# Patient Record
Sex: MALE | Race: BLACK OR AFRICAN AMERICAN | NOT HISPANIC OR LATINO | Employment: FULL TIME | URBAN - METROPOLITAN AREA
[De-identification: names, ages, dates, MRNs, and addresses within clinical notes are randomized per-mention and may not be internally consistent; named-entity substitution may affect disease eponyms.]

---

## 2020-11-20 ENCOUNTER — OFFICE VISIT (OUTPATIENT)
Dept: FAMILY MEDICINE CLINIC | Facility: CLINIC | Age: 35
End: 2020-11-20

## 2020-11-20 VITALS
DIASTOLIC BLOOD PRESSURE: 90 MMHG | BODY MASS INDEX: 25.8 KG/M2 | HEIGHT: 70 IN | OXYGEN SATURATION: 98 % | TEMPERATURE: 98 F | RESPIRATION RATE: 14 BRPM | WEIGHT: 180.2 LBS | SYSTOLIC BLOOD PRESSURE: 126 MMHG | HEART RATE: 71 BPM

## 2020-11-20 DIAGNOSIS — K64.9 BLEEDING HEMORRHOID: ICD-10-CM

## 2020-11-20 DIAGNOSIS — Z72.51 HIGH RISK HETEROSEXUAL BEHAVIOR: Primary | ICD-10-CM

## 2020-11-20 DIAGNOSIS — R22.2 MASS OF SKIN OF ABDOMEN: ICD-10-CM

## 2020-11-20 DIAGNOSIS — K62.5 PAINLESS RECTAL BLEEDING: ICD-10-CM

## 2020-11-20 PROCEDURE — 86803 HEPATITIS C AB TEST: CPT | Performed by: FAMILY MEDICINE

## 2020-11-20 PROCEDURE — 90471 IMMUNIZATION ADMIN: CPT | Performed by: FAMILY MEDICINE

## 2020-11-20 PROCEDURE — G0432 EIA HIV-1/HIV-2 SCREEN: HCPCS | Performed by: FAMILY MEDICINE

## 2020-11-20 PROCEDURE — 99204 OFFICE O/P NEW MOD 45 MIN: CPT | Performed by: FAMILY MEDICINE

## 2020-11-20 PROCEDURE — 90715 TDAP VACCINE 7 YRS/> IM: CPT | Performed by: FAMILY MEDICINE

## 2020-11-20 PROCEDURE — 86592 SYPHILIS TEST NON-TREP QUAL: CPT | Performed by: FAMILY MEDICINE

## 2020-11-20 NOTE — PROGRESS NOTES
Grupo Sandoval is a 35 y.o. male who presents today to establish care.    Chief Complaint   Patient presents with   • Establish Care   • Mass     bumps on stomach x 1 year        Patient is here to establish care. He was previously being seen by  in [place] and was not happy with the care he was being given. He has his annual wellness exam in March of this year. He would like to be screening for STDs as he has had unprotected sex with 4 new female partners since the last time he was checked. He has occasional burning with urination but no other symptoms. He does not know if any of the women have had similar or different symptoms. Patient reports he has two bumps on on either side of his abdomen that have been present for about a year. The one on the left has been increasing and size and is tender to the touch. No skin changes, heat to touch, or redness. He denies N/V/D/C, or weight changes. He has noticed that over the past year he has noticed blood in the toilet and on his toilet paper periodically. He has not noticed if the blood is on his stool or in it. He denies pain with defecation. His father had colon cancer or prostate cancer in his 50's but he doesn't know which one.          Review of Systems   Constitutional: Negative for diaphoresis, fatigue, fever and unexpected weight loss.   HENT: Negative for congestion, ear pain and sore throat.    Eyes: Negative for visual disturbance.   Respiratory: Negative for cough, shortness of breath and wheezing.    Cardiovascular: Negative for chest pain and palpitations.   Gastrointestinal: Positive for anal bleeding and blood in stool. Negative for abdominal pain, constipation, diarrhea, nausea, vomiting and GERD.   Endocrine: Negative for polydipsia and polyuria.   Genitourinary: Positive for dysuria. Negative for decreased urine volume, difficulty urinating, discharge, penile pain, penile swelling, scrotal swelling, testicular pain, urgency and urinary  incontinence.   Musculoskeletal: Negative for joint swelling.   Skin: Negative for rash and skin lesions.        mass   Allergic/Immunologic: Negative for environmental allergies.   Neurological: Negative for seizures and syncope.   Hematological: Does not bruise/bleed easily.   Psychiatric/Behavioral: Negative for suicidal ideas.        PHQ-9 Depression Screening  Little interest or pleasure in doing things? 0   Feeling down, depressed, or hopeless? 0   Trouble falling or staying asleep, or sleeping too much?     Feeling tired or having little energy?     Poor appetite or overeating?     Feeling bad about yourself - or that you are a failure or have let yourself or your family down?     Trouble concentrating on things, such as reading the newspaper or watching television?     Moving or speaking so slowly that other people could have noticed? Or the opposite - being so fidgety or restless that you have been moving around a lot more than usual?     Thoughts that you would be better off dead, or of hurting yourself in some way?     PHQ-9 Total Score 0   If you checked off any problems, how difficult have these problems made it for you to do your work, take care of things at home, or get along with other people?         History reviewed. No pertinent past medical history.     Past Surgical History:   Procedure Laterality Date   • WISDOM TOOTH EXTRACTION  2014        Family History   Problem Relation Age of Onset   • Stroke Mother         multiple   • Cancer Father         Unsure wether colon or prostate   • No Known Problems Sister    • No Known Problems Brother    • No Known Problems Son    • Cancer Maternal Grandmother         Unsure specifics   • Cancer Maternal Grandfather         Unsure Specifics   • Other Paternal Grandmother         possible brain tumor   • Other Paternal Grandfather         unknown   • No Known Problems Son         Social History     Socioeconomic History   • Marital status: Single     Spouse  "name: Not on file   • Number of children: Not on file   • Years of education: Not on file   • Highest education level: Not on file   Tobacco Use   • Smoking status: Former Smoker   • Smokeless tobacco: Never Used   Substance and Sexual Activity   • Alcohol use: Yes     Frequency: 2-3 times a week     Comment: Occasionally Roughly a pint of liquor a week   • Drug use: Yes     Types: Marijuana     Comment: occasional   • Sexual activity: Yes     Partners: Female     Birth control/protection: None     Comment: 7 female partners in the last year        No current outpatient medications on file prior to visit.     No current facility-administered medications on file prior to visit.        No Known Allergies     Visit Vitals  /90   Pulse 71   Temp 98 °F (36.7 °C) (Temporal)   Resp 14   Ht 177.8 cm (70\")   Wt 81.7 kg (180 lb 3.2 oz)   SpO2 98%   BMI 25.86 kg/m²      Body mass index is 25.86 kg/m².    Physical Exam  Constitutional:       General: He is not in acute distress.     Appearance: He is well-developed. He is not diaphoretic.   HENT:      Head: Atraumatic.   Neck:      Musculoskeletal: Normal range of motion and neck supple.   Cardiovascular:      Rate and Rhythm: Normal rate and regular rhythm.      Heart sounds: Normal heart sounds. No murmur. No friction rub. No gallop.    Pulmonary:      Effort: Pulmonary effort is normal. No respiratory distress.      Breath sounds: Normal breath sounds. No wheezing or rales.   Abdominal:      General: Bowel sounds are normal. There is no distension.      Palpations: Abdomen is soft. There is mass (2 soft tissue skin masses that are firm and mobile approximately 1 to 1/2 cm diameter 1 was 10 cm to the right of the umbilicus the other was in the left mid axillary line just below the ribs.).      Tenderness: There is no abdominal tenderness.   Skin:     General: Skin is warm and dry.   Neurological:      Mental Status: He is alert and oriented to person, place, and time. "   Psychiatric:         Behavior: Behavior normal.          No results found for this or any previous visit.     Problems Addressed this Visit        Digestive    Painless rectal bleeding     Patient was given treatment for hemorrhoids.  He will begin supportive care for hemorrhoids.  If he continues to have rectal bleeding is follow-up in 8 weeks will refer to colorectal surgery for further evaluation.         Relevant Medications    Hydrocort-Pramoxine, Perianal, (PROCTOFOAM-HS) 1-1 % rectal foam       Musculoskeletal and Integument    Mass of skin of abdomen     Mass most likely lipoma.  Will order ultrasound for further evaluation.         Relevant Orders    US Abdomen Limited       Other    High risk heterosexual behavior - Primary     We will recheck for STDs at this time.         Relevant Orders    Hepatitis C Antibody    HIV-1 / O / 2 Ag / Antibody 4th Generation    RPR      Other Visit Diagnoses     Bleeding hemorrhoid        Relevant Medications    Hydrocort-Pramoxine, Perianal, (PROCTOFOAM-HS) 1-1 % rectal foam      Diagnoses       Codes Comments    High risk heterosexual behavior    -  Primary ICD-10-CM: Z72.51  ICD-9-CM: V69.2     Mass of skin of abdomen     ICD-10-CM: R22.2  ICD-9-CM: 782.2     Painless rectal bleeding     ICD-10-CM: K62.5  ICD-9-CM: 569.3     Bleeding hemorrhoid     ICD-10-CM: K64.9  ICD-9-CM: 455.8           Return in about 8 weeks (around 1/15/2021) for Follow-up rectal bleeding.    Parts of this office note have been dictated by voice recognition software. Grammatical and/or spelling errors may be present.     Jacob Barahona MD  11/20/2020

## 2020-11-20 NOTE — ASSESSMENT & PLAN NOTE
Patient was given treatment for hemorrhoids.  He will begin supportive care for hemorrhoids.  If he continues to have rectal bleeding is follow-up in 8 weeks will refer to colorectal surgery for further evaluation.

## 2020-11-20 NOTE — PATIENT INSTRUCTIONS
Hemorrhoids  Hemorrhoids are swollen veins in and around the rectum or anus. There are two types of hemorrhoids:  · Internal hemorrhoids. These occur in the veins that are just inside the rectum. They may poke through to the outside and become irritated and painful.  · External hemorrhoids. These occur in the veins that are outside the anus and can be felt as a painful swelling or hard lump near the anus.  Most hemorrhoids do not cause serious problems, and they can be managed with home treatments such as diet and lifestyle changes. If home treatments do not help the symptoms, procedures can be done to shrink or remove the hemorrhoids.  What are the causes?  This condition is caused by increased pressure in the anal area. This pressure may result from various things, including:  · Constipation.  · Straining to have a bowel movement.  · Diarrhea.  · Pregnancy.  · Obesity.  · Sitting for long periods of time.  · Heavy lifting or other activity that causes you to strain.  · Anal sex.  · Riding a bike for a long period of time.  What are the signs or symptoms?  Symptoms of this condition include:  · Pain.  · Anal itching or irritation.  · Rectal bleeding.  · Leakage of stool (feces).  · Anal swelling.  · One or more lumps around the anus.  How is this diagnosed?  This condition can often be diagnosed through a visual exam. Other exams or tests may also be done, such as:  · An exam that involves feeling the rectal area with a gloved hand (digital rectal exam).  · An exam of the anal canal that is done using a small tube (anoscope).  · A blood test, if you have lost a significant amount of blood.  · A test to look inside the colon using a flexible tube with a camera on the end (sigmoidoscopy or colonoscopy).  How is this treated?  This condition can usually be treated at home. However, various procedures may be done if dietary changes, lifestyle changes, and other home treatments do not help your symptoms. These  procedures can help make the hemorrhoids smaller or remove them completely. Some of these procedures involve surgery, and others do not. Common procedures include:  · Rubber band ligation. Rubber bands are placed at the base of the hemorrhoids to cut off their blood supply.  · Sclerotherapy. Medicine is injected into the hemorrhoids to shrink them.  · Infrared coagulation. A type of light energy is used to get rid of the hemorrhoids.  · Hemorrhoidectomy surgery. The hemorrhoids are surgically removed, and the veins that supply them are tied off.  · Stapled hemorrhoidopexy surgery. The surgeon staples the base of the hemorrhoid to the rectal wall.  Follow these instructions at home:  Eating and drinking    · Eat foods that have a lot of fiber in them, such as whole grains, beans, nuts, fruits, and vegetables.  · Ask your health care provider about taking products that have added fiber (fiber supplements).  · Reduce the amount of fat in your diet. You can do this by eating low-fat dairy products, eating less red meat, and avoiding processed foods.  · Drink enough fluid to keep your urine pale yellow.  Managing pain and swelling    · Take warm sitz baths for 20 minutes, 3-4 times a day to ease pain and discomfort. You may do this in a bathtub or using a portable sitz bath that fits over the toilet.  · If directed, apply ice to the affected area. Using ice packs between sitz baths may be helpful.  ? Put ice in a plastic bag.  ? Place a towel between your skin and the bag.  ? Leave the ice on for 20 minutes, 2-3 times a day.  General instructions  · Take over-the-counter and prescription medicines only as told by your health care provider.  · Use medicated creams or suppositories as told.  · Get regular exercise. Ask your health care provider how much and what kind of exercise is best for you. In general, you should do moderate exercise for at least 30 minutes on most days of the week (150 minutes each week). This can  include activities such as walking, biking, or yoga.  · Go to the bathroom when you have the urge to have a bowel movement. Do not wait.  · Avoid straining to have bowel movements.  · Keep the anal area dry and clean. Use wet toilet paper or moist towelettes after a bowel movement.  · Do not sit on the toilet for long periods of time. This increases blood pooling and pain.  · Keep all follow-up visits as told by your health care provider. This is important.  Contact a health care provider if you have:  · Increasing pain and swelling that are not controlled by treatment or medicine.  · Difficulty having a bowel movement, or you are unable to have a bowel movement.  · Pain or inflammation outside the area of the hemorrhoids.  Get help right away if you have:  · Uncontrolled bleeding from your rectum.  Summary  · Hemorrhoids are swollen veins in and around the rectum or anus.  · Most hemorrhoids can be managed with home treatments such as diet and lifestyle changes.  · Taking warm sitz baths can help ease pain and discomfort.  · In severe cases, procedures or surgery can be done to shrink or remove the hemorrhoids.  This information is not intended to replace advice given to you by your health care provider. Make sure you discuss any questions you have with your health care provider.  Document Released: 12/15/2001 Document Revised: 05/15/2020 Document Reviewed: 05/09/2019  Elsevier Patient Education © 2020 [employer]

## 2020-11-21 LAB
HCV AB SER DONR QL: NORMAL
HIV1+2 AB SER QL: NORMAL
RPR SER QL: NORMAL

## 2020-12-01 DIAGNOSIS — K62.5 PAINLESS RECTAL BLEEDING: Primary | ICD-10-CM

## 2020-12-01 RX ORDER — HYDROCORTISONE ACETATE PRAMOXINE HCL 1; 1 G/100G; G/100G
CREAM TOPICAL 2 TIMES DAILY
Qty: 28.4 G | Refills: 1 | Status: SHIPPED | OUTPATIENT
Start: 2020-12-01 | End: 2022-04-12

## 2020-12-04 ENCOUNTER — HOSPITAL ENCOUNTER (OUTPATIENT)
Dept: ULTRASOUND IMAGING | Facility: HOSPITAL | Age: 35
Discharge: HOME | End: 2020-12-04
Admitting: FAMILY MEDICINE

## 2020-12-04 DIAGNOSIS — R22.2 MASS OF SKIN OF ABDOMEN: ICD-10-CM

## 2020-12-04 PROCEDURE — 76705 ECHO EXAM OF ABDOMEN: CPT

## 2021-07-02 ENCOUNTER — E-VISIT (OUTPATIENT)
Dept: FAMILY MEDICINE CLINIC | Facility: TELEHEALTH | Age: 36
End: 2021-07-02

## 2021-07-02 DIAGNOSIS — R39.89 SUSPECTED UTI: Primary | ICD-10-CM

## 2021-07-02 PROCEDURE — 99422 OL DIG E/M SVC 11-20 MIN: CPT | Performed by: NURSE PRACTITIONER

## 2021-07-02 RX ORDER — SULFAMETHOXAZOLE AND TRIMETHOPRIM 800; 160 MG/1; MG/1
1 TABLET ORAL 2 TIMES DAILY
Qty: 14 TABLET | Refills: 0 | Status: SHIPPED | OUTPATIENT
Start: 2021-07-02 | End: 2021-07-09

## 2021-07-02 NOTE — PATIENT INSTRUCTIONS
Urinary Tract Infection, Adult    A urinary tract infection (UTI) is an infection of any part of the urinary tract. The urinary tract includes the kidneys, ureters, bladder, and urethra. These organs make, store, and get rid of urine in the body.  Your health care provider may use other names to describe the infection. An upper UTI affects the ureters and kidneys (pyelonephritis). A lower UTI affects the bladder (cystitis) and urethra (urethritis).  What are the causes?  Most urinary tract infections are caused by bacteria in your genital area, around the entrance to your urinary tract (urethra). These bacteria grow and cause inflammation of your urinary tract.  What increases the risk?  You are more likely to develop this condition if:  · You have a urinary catheter that stays in place (indwelling).  · You are not able to control when you urinate or have a bowel movement (you have incontinence).  · You are female and you:  ? Use a spermicide or diaphragm for birth control.  ? Have low estrogen levels.  ? Are pregnant.  · You have certain genes that increase your risk (genetics).  · You are sexually active.  · You take antibiotic medicines.  · You have a condition that causes your flow of urine to slow down, such as:  ? An enlarged prostate, if you are male.  ? Blockage in your urethra (stricture).  ? A kidney stone.  ? A nerve condition that affects your bladder control (neurogenic bladder).  ? Not getting enough to drink, or not urinating often.  · You have certain medical conditions, such as:  ? Diabetes.  ? A weak disease-fighting system (immunesystem).  ? Sickle cell disease.  ? Gout.  ? Spinal cord injury.  What are the signs or symptoms?  Symptoms of this condition include:  · Needing to urinate right away (urgently).  · Frequent urination or passing small amounts of urine frequently.  · Pain or burning with urination.  · Blood in the urine.  · Urine that smells bad or unusual.  · Trouble urinating.  · Cloudy  urine.  · Vaginal discharge, if you are female.  · Pain in the abdomen or the lower back.  You may also have:  · Vomiting or a decreased appetite.  · Confusion.  · Irritability or tiredness.  · A fever.  · Diarrhea.  The first symptom in older adults may be confusion. In some cases, they may not have any symptoms until the infection has worsened.  How is this diagnosed?  This condition is diagnosed based on your medical history and a physical exam. You may also have other tests, including:  · Urine tests.  · Blood tests.  · Tests for sexually transmitted infections (STIs).  If you have had more than one UTI, a cystoscopy or imaging studies may be done to determine the cause of the infections.  How is this treated?  Treatment for this condition includes:  · Antibiotic medicine.  · Over-the-counter medicines to treat discomfort.  · Drinking enough water to stay hydrated.  If you have frequent infections or have other conditions such as a kidney stone, you may need to see a health care provider who specializes in the urinary tract (urologist).  In rare cases, urinary tract infections can cause sepsis. Sepsis is a life-threatening condition that occurs when the body responds to an infection. Sepsis is treated in the hospital with IV antibiotics, fluids, and other medicines.  Follow these instructions at home:    Medicines  · Take over-the-counter and prescription medicines only as told by your health care provider.  · If you were prescribed an antibiotic medicine, take it as told by your health care provider. Do not stop using the antibiotic even if you start to feel better.  General instructions  · Make sure you:  ? Empty your bladder often and completely. Do not hold urine for long periods of time.  ? Empty your bladder after sex.  ? Wipe from front to back after a bowel movement if you are female. Use each tissue one time when you wipe.  · Drink enough fluid to keep your urine pale yellow.  · Keep all follow-up  visits as told by your health care provider. This is important.  Contact a health care provider if:  · Your symptoms do not get better after 1-2 days.  · Your symptoms go away and then return.  Get help right away if you have:  · Severe pain in your back or your lower abdomen.  · A fever.  · Nausea or vomiting.  Summary  · A urinary tract infection (UTI) is an infection of any part of the urinary tract, which includes the kidneys, ureters, bladder, and urethra.  · Most urinary tract infections are caused by bacteria in your genital area, around the entrance to your urinary tract (urethra).  · Treatment for this condition often includes antibiotic medicines.  · If you were prescribed an antibiotic medicine, take it as told by your health care provider. Do not stop using the antibiotic even if you start to feel better.  · Keep all follow-up visits as told by your health care provider. This is important.  This information is not intended to replace advice given to you by your health care provider. Make sure you discuss any questions you have with your health care provider.  Document Revised: 12/05/2019 Document Reviewed: 06/27/2019  [employer] Patient Education © 2021 [employer]

## 2021-07-02 NOTE — PROGRESS NOTES
Grupo Sandoval    1985  [id number]    I have reviewed the e-Visit questionnaire and patient's answers, my assessment and plan are as follows:      HPI  Grupo Sandoval is a 36 y.o. with a 5-7 day history of dysuria described as burning.  He denies fever/chills, nausea/vomiting, belly/back pain, hematuria, penile discharge.  He has had similar symptoms in the past which resolved with antibiotic treatment.    Review of Systems - Negative except symptom listed in HPI      Diagnoses and all orders for this visit:    1. Suspected UTI (Primary)  -     sulfamethoxazole-trimethoprim (Bactrim DS) 800-160 MG per tablet; Take 1 tablet by mouth 2 (Two) Times a Day for 7 days.  Dispense: 14 tablet; Refill: 0    --Bactrim DS as prescribed - complete entire course of medication even if you begin to feel better.     -Continue to increase your fluid intake.   -Abstain from intercourse during antibiotic treatment.   -Do not hold your urine- go to the bathroom every 2-3 hours.     -Warning signs: severe abdominal/pelvic/back pain, fever >101, blood in urine - seek medical attention as soon as possible for a hands on/objective exam and possible labs.     -Follow up with your PCP in 2 days if no improvement in symptoms or if symptoms begin to worsen.       Any medications prescribed have been sent electronically to   [facility]/pharmacy #[id number] - [place], [place] - [address] [place] [address] AT [place] [address] - [phone] [facility] [phone] [address] [address]  Phone: [phone] Fax: [phone]      Time Documentation  Counseled patient  Counseling topics: diagnosis, treatment options and return instructions  Total encounter time: counseling time more than 50% of visit: 20 minutes        KATIA Montilla  07/02/21  08:16 EDT

## 2022-01-06 ENCOUNTER — OFFICE VISIT (OUTPATIENT)
Dept: FAMILY MEDICINE CLINIC | Facility: CLINIC | Age: 37
End: 2022-01-06

## 2022-01-06 VITALS
BODY MASS INDEX: 26.92 KG/M2 | WEIGHT: 188 LBS | DIASTOLIC BLOOD PRESSURE: 78 MMHG | HEIGHT: 70 IN | OXYGEN SATURATION: 95 % | SYSTOLIC BLOOD PRESSURE: 120 MMHG | RESPIRATION RATE: 17 BRPM | TEMPERATURE: 98.7 F | HEART RATE: 75 BPM

## 2022-01-06 DIAGNOSIS — K62.5 PAINLESS RECTAL BLEEDING: ICD-10-CM

## 2022-01-06 DIAGNOSIS — R14.0 BLOATING: ICD-10-CM

## 2022-01-06 DIAGNOSIS — K92.1 BLOOD IN STOOL: Primary | ICD-10-CM

## 2022-01-06 DIAGNOSIS — D17.9 MULTIPLE LIPOMAS: ICD-10-CM

## 2022-01-06 PROCEDURE — 99214 OFFICE O/P EST MOD 30 MIN: CPT | Performed by: STUDENT IN AN ORGANIZED HEALTH CARE EDUCATION/TRAINING PROGRAM

## 2022-01-06 NOTE — ASSESSMENT & PLAN NOTE
Reviewed his ultrasound done in 2020. Will repeat. He would like to be referred to derm for removal.

## 2022-01-06 NOTE — PROGRESS NOTES
New Patient Office Visit      Patient Name: Grupo Sandoval  : 1985   MRN: [id number]     Chief Complaint:  GI Problem and Bloated     History of Present Illness:     Says he has had bloating in his stomach and has noticed it the past couple of weeks. He still has blood in his stool and has had for a couple years now. Complains of heartburn and has not had heartburn before. Says he does not want an acid blocking medication as he is more worried about the blood in stool. Also having gas. Denies stomach pain. Says he used to drink a pint of alcohol a day but says he has not drank since February of last year. He is unsure if he has hemorrhoids. No nausea or vomiting.  Says his dad has a history of colon cancer.         Subjective        History reviewed. No pertinent past medical history.    Past Surgical History:   Procedure Laterality Date   • WISDOM TOOTH EXTRACTION         Family History   Problem Relation Age of Onset   • Stroke Mother         multiple   • Cancer Father         Unsure wether colon or prostate   • No Known Problems Sister    • No Known Problems Brother    • No Known Problems Son    • Cancer Maternal Grandmother         Unsure specifics   • Cancer Maternal Grandfather         Unsure Specifics   • Other Paternal Grandmother         possible brain tumor   • Other Paternal Grandfather         unknown   • No Known Problems Son        Social History     Socioeconomic History   • Marital status: Single   Tobacco Use   • Smoking status: Former Smoker   • Smokeless tobacco: Never Used   Substance and Sexual Activity   • Alcohol use: Yes     Comment: Occasionally Roughly a pint of liquor a week   • Drug use: Yes     Types: Marijuana     Comment: occasional   • Sexual activity: Yes     Partners: Female     Birth control/protection: None     Comment: 7 female partners in the last year          Current Outpatient Medications:   •  Hydrocortisone Ace-Pramoxine 1-1 % rectal cream, Insert  into the  "rectum 2 (Two) Times a Day., Disp: 28.4 g, Rfl: 1    No Known Allergies    Objective     Physical Exam:  Vitals:    01/06/22 1225   BP: 120/78   Pulse: 75   Resp: 17   Temp: 98.7 °F (37.1 °C)   SpO2: 95%   Weight: 85.3 kg (188 lb)   Height: 177.8 cm (70\")   PainSc: 0-No pain      Body mass index is 26.98 kg/m².     Physical Exam  Constitutional:       General: He is not in acute distress.     Appearance: Normal appearance.   HENT:      Head: Normocephalic and atraumatic.   Eyes:      Extraocular Movements: Extraocular movements intact.   Cardiovascular:      Rate and Rhythm: Normal rate and regular rhythm.      Heart sounds: No murmur heard.      Pulmonary:      Effort: Pulmonary effort is normal. No respiratory distress.      Breath sounds: Normal breath sounds.   Abdominal:      General: Abdomen is flat. Bowel sounds are normal. There is no distension.      Palpations: Abdomen is soft. There is no mass.      Tenderness: There is no abdominal tenderness. There is no guarding or rebound.      Hernia: No hernia is present.      Comments: No obvious skin lesions on abdomen on inspection but has lipomas on abdomen on palpation    Musculoskeletal:         General: No swelling.      Cervical back: Normal range of motion.   Skin:     Findings: No rash.   Neurological:      General: No focal deficit present.      Mental Status: He is alert.   Psychiatric:         Mood and Affect: Mood normal.              Assessment / Plan      Assessment/Plan:   Diagnoses and all orders for this visit:    1. Blood in stool (Primary)  -     Ambulatory Referral to Gastroenterology  -     CBC (No Diff)  -     Comprehensive Metabolic Panel    2. Bloating  -     Celiac Disease Panel; Future    3. Multiple lipomas  Assessment & Plan:  Reviewed his ultrasound done in 2020. Will repeat. He would like to be referred to derm for removal.     Orders:  -     Ambulatory Referral to Dermatology    4. Painless rectal bleeding  Assessment & Plan:  Rectal " exam done with franchesca mckenna in the room. No hemorrhoids, lesions or rash seen. Will refer to gi for scope given his history of family colon cancer. Advised him to go to er for any worsening bleeding or bloating or if any stomach pain develops.          Return in about 2 weeks (around 1/20/2022).       Becky Combs D.O.  [facility]

## 2022-01-06 NOTE — ASSESSMENT & PLAN NOTE
Rectal exam done with franchesca mckenna in the room. No hemorrhoids, lesions or rash seen. Will refer to gi for scope given his history of family colon cancer. Advised him to go to er for any worsening bleeding or bloating or if any stomach pain develops.

## 2022-01-19 ENCOUNTER — TRANSCRIBE ORDERS (OUTPATIENT)
Dept: ADMINISTRATIVE | Facility: HOSPITAL | Age: 37
End: 2022-01-19

## 2022-01-19 DIAGNOSIS — Z11.59 ENCOUNTER FOR SCREENING FOR VIRAL DISEASE: Primary | ICD-10-CM

## 2022-01-21 ENCOUNTER — LAB (OUTPATIENT)
Dept: LAB | Facility: HOSPITAL | Age: 37
End: 2022-01-21

## 2022-01-21 ENCOUNTER — TELEPHONE (OUTPATIENT)
Dept: FAMILY MEDICINE CLINIC | Facility: CLINIC | Age: 37
End: 2022-01-21

## 2022-01-21 ENCOUNTER — HOSPITAL ENCOUNTER (OUTPATIENT)
Dept: ULTRASOUND IMAGING | Facility: HOSPITAL | Age: 37
Discharge: HOME | End: 2022-01-21

## 2022-01-21 DIAGNOSIS — R14.0 BLOATING: ICD-10-CM

## 2022-01-21 DIAGNOSIS — D17.9 MULTIPLE LIPOMAS: ICD-10-CM

## 2022-01-21 LAB
DEPRECATED RDW RBC AUTO: 40.2 FL (ref 37–54)
ERYTHROCYTE [DISTWIDTH] IN BLOOD BY AUTOMATED COUNT: 12.2 % (ref 12.3–15.4)
HCT VFR BLD AUTO: 46.7 % (ref 37.5–51)
HGB BLD-MCNC: 15.9 G/DL (ref 13–17.7)
MCH RBC QN AUTO: 31.2 PG (ref 26.6–33)
MCHC RBC AUTO-ENTMCNC: 34 G/DL (ref 31.5–35.7)
MCV RBC AUTO: 91.7 FL (ref 79–97)
PLATELET # BLD AUTO: 275 10*3/MM3 (ref 140–450)
PMV BLD AUTO: 9 FL (ref 6–12)
RBC # BLD AUTO: 5.09 10*6/MM3 (ref 4.14–5.8)
WBC NRBC COR # BLD: 5.76 10*3/MM3 (ref 3.4–10.8)

## 2022-01-21 PROCEDURE — 82784 ASSAY IGA/IGD/IGG/IGM EACH: CPT

## 2022-01-21 PROCEDURE — 76705 ECHO EXAM OF ABDOMEN: CPT

## 2022-01-21 PROCEDURE — 80053 COMPREHEN METABOLIC PANEL: CPT | Performed by: STUDENT IN AN ORGANIZED HEALTH CARE EDUCATION/TRAINING PROGRAM

## 2022-01-21 PROCEDURE — 86364 TISS TRNSGLTMNASE EA IG CLAS: CPT

## 2022-01-21 PROCEDURE — 85027 COMPLETE CBC AUTOMATED: CPT | Performed by: STUDENT IN AN ORGANIZED HEALTH CARE EDUCATION/TRAINING PROGRAM

## 2022-01-21 PROCEDURE — 86231 EMA EACH IG CLASS: CPT

## 2022-01-21 NOTE — TELEPHONE ENCOUNTER
Caller: Grupo Sandoval    Relationship: Self    Best call back number: [phone]    What orders are you requesting (i.e. lab or imaging):LAB WORK     In what timeframe would the patient need to come in: N/A    Where will you receive your lab/imaging services: [facility]     Additional notes: PATIENT STATED THAT HE COULDN'T LEAVE WORK AND WAS RUNNING LATE TO APPOINTMENT SO PATIENT CANCELLED BUT JUST WANTED TO SEE IF HE COULD DO THE BLOOD WORK AND GET THAT SENT TO [facility]     PLEASE ADVISE

## 2022-01-22 LAB
ALBUMIN SERPL-MCNC: 5 G/DL (ref 3.5–5.2)
ALBUMIN/GLOB SERPL: 1.9 G/DL
ALP SERPL-CCNC: 54 U/L (ref 39–117)
ALT SERPL W P-5'-P-CCNC: 18 U/L (ref 1–41)
ANION GAP SERPL CALCULATED.3IONS-SCNC: 10.5 MMOL/L (ref 5–15)
AST SERPL-CCNC: 19 U/L (ref 1–40)
BILIRUB SERPL-MCNC: 0.6 MG/DL (ref 0–1.2)
BUN SERPL-MCNC: 13 MG/DL (ref 6–20)
BUN/CREAT SERPL: 16.5 (ref 7–25)
CALCIUM SPEC-SCNC: 9.8 MG/DL (ref 8.6–10.5)
CHLORIDE SERPL-SCNC: 102 MMOL/L (ref 98–107)
CO2 SERPL-SCNC: 28.5 MMOL/L (ref 22–29)
CREAT SERPL-MCNC: 0.79 MG/DL (ref 0.76–1.27)
GFR SERPL CREATININE-BSD FRML MDRD: 136 ML/MIN/1.73
GLOBULIN UR ELPH-MCNC: 2.7 GM/DL
GLUCOSE SERPL-MCNC: 57 MG/DL (ref 65–99)
POTASSIUM SERPL-SCNC: 4 MMOL/L (ref 3.5–5.2)
PROT SERPL-MCNC: 7.7 G/DL (ref 6–8.5)
SODIUM SERPL-SCNC: 141 MMOL/L (ref 136–145)

## 2022-01-24 LAB
ENDOMYSIUM IGA SER QL: NEGATIVE
IGA SERPL-MCNC: 167 MG/DL (ref 90–386)
TTG IGA SER-ACNC: <2 U/ML (ref 0–3)

## 2022-01-26 DIAGNOSIS — Z12.11 ENCOUNTER FOR SCREENING COLONOSCOPY: Primary | ICD-10-CM

## 2022-01-30 ENCOUNTER — LAB (OUTPATIENT)
Dept: PREADMISSION TESTING | Facility: HOSPITAL | Age: 37
End: 2022-01-30

## 2022-01-30 DIAGNOSIS — Z11.59 ENCOUNTER FOR SCREENING FOR VIRAL DISEASE: ICD-10-CM

## 2022-01-30 LAB — SARS-COV-2 RNA PNL SPEC NAA+PROBE: NOT DETECTED

## 2022-01-30 PROCEDURE — U0004 COV-19 TEST NON-CDC HGH THRU: HCPCS | Performed by: SURGERY

## 2022-02-01 ENCOUNTER — LAB REQUISITION (OUTPATIENT)
Dept: LAB | Facility: HOSPITAL | Age: 37
End: 2022-02-01

## 2022-02-01 DIAGNOSIS — D17.1 BENIGN LIPOMATOUS NEOPLASM OF SKIN AND SUBCUTANEOUS TISSUE OF TRUNK: ICD-10-CM

## 2022-02-01 PROCEDURE — 88304 TISSUE EXAM BY PATHOLOGIST: CPT | Performed by: SURGERY

## 2022-02-02 LAB
CYTO UR: NORMAL
LAB AP CASE REPORT: NORMAL
LAB AP CLINICAL INFORMATION: NORMAL
PATH REPORT.FINAL DX SPEC: NORMAL
PATH REPORT.GROSS SPEC: NORMAL

## 2022-02-25 ENCOUNTER — OUTSIDE FACILITY SERVICE (OUTPATIENT)
Dept: GASTROENTEROLOGY | Facility: CLINIC | Age: 37
End: 2022-02-25

## 2022-02-25 PROCEDURE — 45385 COLONOSCOPY W/LESION REMOVAL: CPT | Performed by: INTERNAL MEDICINE

## 2022-02-25 PROCEDURE — 45381 COLONOSCOPY SUBMUCOUS NJX: CPT | Performed by: INTERNAL MEDICINE

## 2022-02-25 PROCEDURE — 45388 COLONOSCOPY W/ABLATION: CPT | Performed by: INTERNAL MEDICINE

## 2022-02-25 PROCEDURE — 88305 TISSUE EXAM BY PATHOLOGIST: CPT | Performed by: INTERNAL MEDICINE

## 2022-02-28 ENCOUNTER — LAB REQUISITION (OUTPATIENT)
Dept: LAB | Facility: HOSPITAL | Age: 37
End: 2022-02-28

## 2022-02-28 DIAGNOSIS — Z80.0 FAMILY HISTORY OF MALIGNANT NEOPLASM OF DIGESTIVE ORGANS: ICD-10-CM

## 2022-02-28 DIAGNOSIS — D12.7 BENIGN NEOPLASM OF RECTOSIGMOID JUNCTION: ICD-10-CM

## 2022-02-28 DIAGNOSIS — D12.5 BENIGN NEOPLASM OF SIGMOID COLON: ICD-10-CM

## 2022-02-28 DIAGNOSIS — Z12.11 ENCOUNTER FOR SCREENING FOR MALIGNANT NEOPLASM OF COLON: ICD-10-CM

## 2022-02-28 DIAGNOSIS — K92.1 MELENA: ICD-10-CM

## 2022-02-28 DIAGNOSIS — K64.8 OTHER HEMORRHOIDS: ICD-10-CM

## 2022-03-04 ENCOUNTER — OFFICE VISIT (OUTPATIENT)
Dept: FAMILY MEDICINE CLINIC | Facility: CLINIC | Age: 37
End: 2022-03-04

## 2022-03-04 VITALS
RESPIRATION RATE: 18 BRPM | HEART RATE: 82 BPM | BODY MASS INDEX: 26.51 KG/M2 | OXYGEN SATURATION: 98 % | DIASTOLIC BLOOD PRESSURE: 82 MMHG | SYSTOLIC BLOOD PRESSURE: 124 MMHG | TEMPERATURE: 98.2 F | HEIGHT: 70 IN | WEIGHT: 185.2 LBS

## 2022-03-04 DIAGNOSIS — Z20.2 EXPOSURE TO STD: Primary | ICD-10-CM

## 2022-03-04 DIAGNOSIS — F40.10 SOCIAL ANXIETY DISORDER: ICD-10-CM

## 2022-03-04 LAB
BILIRUB BLD-MCNC: NEGATIVE MG/DL
CLARITY, POC: CLEAR
COLOR UR: YELLOW
EXPIRATION DATE: NORMAL
GLUCOSE UR STRIP-MCNC: NEGATIVE MG/DL
KETONES UR QL: NEGATIVE
LEUKOCYTE EST, POC: NEGATIVE
Lab: NORMAL
NITRITE UR-MCNC: NEGATIVE MG/ML
PH UR: 6.5 [PH] (ref 5–8)
PROT UR STRIP-MCNC: NEGATIVE MG/DL
RBC # UR STRIP: NEGATIVE /UL
SP GR UR: 1.01 (ref 1–1.03)
UROBILINOGEN UR QL: NORMAL

## 2022-03-04 PROCEDURE — 81003 URINALYSIS AUTO W/O SCOPE: CPT | Performed by: NURSE PRACTITIONER

## 2022-03-04 PROCEDURE — 99213 OFFICE O/P EST LOW 20 MIN: CPT | Performed by: NURSE PRACTITIONER

## 2022-03-04 NOTE — PROGRESS NOTES
"Chief Complaint  Exposure to STD    Subjective          Grupo Sandoval presents to [facility]  Patient presents to office requesting STI testing due possible exposure to STI. Reports symptoms of burning with urination. Denies fever or chills, lesions or rash to genital area or no abnormal discharge. Denies abdominal pain. States his stomach has been bloating at times but admits he recently had colonoscopy.  Patient also reports social anxiety history and requesting referral for this. He has never taken any medication in past or never had counseling. States he was even nervous to call and get appointment here at clinic.       Objective   Vital Signs:   /82   Pulse 82   Temp 98.2 °F (36.8 °C)   Resp 18   Ht 177.8 cm (70\")   Wt 84 kg (185 lb 3.2 oz)   SpO2 98%   BMI 26.57 kg/m²     Physical Exam  Vitals and nursing note reviewed.   Constitutional:       General: He is not in acute distress.     Appearance: Normal appearance.   HENT:      Head: Normocephalic.      Right Ear: External ear normal.      Left Ear: External ear normal.      Nose: Nose normal.   Eyes:      Extraocular Movements: Extraocular movements intact.      Conjunctiva/sclera: Conjunctivae normal.      Pupils: Pupils are equal, round, and reactive to light.   Cardiovascular:      Rate and Rhythm: Normal rate and regular rhythm.      Heart sounds: Normal heart sounds.   Pulmonary:      Effort: Pulmonary effort is normal.      Breath sounds: Normal breath sounds.   Musculoskeletal:      Right lower leg: No edema.      Left lower leg: No edema.   Skin:     General: Skin is warm and dry.   Neurological:      Mental Status: He is alert and oriented to person, place, and time.   Psychiatric:         Mood and Affect: Mood is anxious.         Behavior: Behavior is withdrawn.         Thought Content: Thought content normal. Thought content does not include suicidal ideation. Thought content does not include " suicidal plan.         Judgment: Judgment normal.        Result Review :                 Assessment and Plan    Diagnoses and all orders for this visit:    1. Exposure to STD (Primary)  -     POCT urinalysis dipstick, automated  -     OneSwab - Kit, Urine, Clean Catch; Future  -     OneSwab - Kit, Urine, Clean Catch    2. Social anxiety disorder  Assessment & Plan:  Psychological condition is worsening.  Referral to psychological counseling.  [facility]ological [facility] and [facility]    Orders:  -     Ambulatory Referral to Behavioral Health    Other orders  -     [facility] - LABS      Follow Up   Return in about 2 weeks (around 3/18/2022) for Annual with PCP Dr. Combs, Annual.  Patient was given instructions and counseling regarding his condition or for health maintenance advice. Please see specific information pulled into the AVS if appropriate.

## 2022-03-19 PROBLEM — Z20.2 EXPOSURE TO STD: Status: ACTIVE | Noted: 2022-03-19

## 2022-03-19 PROBLEM — F40.10 SOCIAL ANXIETY DISORDER: Status: ACTIVE | Noted: 2022-03-19

## 2022-03-19 NOTE — ASSESSMENT & PLAN NOTE
Psychological condition is worsening.  Referral to psychological counseling.  Psychological condition  [facility]

## 2022-03-21 DIAGNOSIS — R14.0 BLOATING: Primary | ICD-10-CM

## 2022-04-12 ENCOUNTER — TELEMEDICINE (OUTPATIENT)
Dept: PSYCHIATRY | Facility: CLINIC | Age: 37
End: 2022-04-12

## 2022-04-12 DIAGNOSIS — F41.1 GENERALIZED ANXIETY DISORDER: Primary | ICD-10-CM

## 2022-04-12 DIAGNOSIS — F40.10 SOCIAL ANXIETY DISORDER: ICD-10-CM

## 2022-04-12 PROCEDURE — 90792 PSYCH DIAG EVAL W/MED SRVCS: CPT

## 2022-04-12 RX ORDER — PROPRANOLOL HYDROCHLORIDE 10 MG/1
10 TABLET ORAL 2 TIMES DAILY PRN
Qty: 60 TABLET | Refills: 0 | Status: SHIPPED | OUTPATIENT
Start: 2022-04-12 | End: 2022-05-09

## 2022-04-12 NOTE — PROGRESS NOTES
This provider is located at [place], [place]. The Patient is seen remotely using Video. Patient is being seen via telehealth and confirm that they are in a secure environment for this session. Patient is located in [place], [place] at his home. The patient's condition being diagnosed/treated is appropriate for telemedicine. Provider identified as Dick Loaiza as well as credentials APRN MSN PMHNP-BC.   The client/patient gave consent to be seen remotely, and when consent is given they understand that the consent allows for patient identifiable information to be sent to a third party as needed.  They may refuse to be seen remotely at any time. The electronic data is encrypted and password protected, and the patient has been advised of the potential risks to privacy not withstanding such measures.    Subjective     Grupo Sandoval is a 37 y.o. male who presents today for initial evaluation     Chief Complaint: Anxiety    History of Present Illness: This is the first encounter for this APRN with the patient.  Patient is referral for social anxiety.  Patient states he is always noticed then he has been anxious.  He states he did have a period where he was drinking alcohol on a daily basis.  States now he only drinks once per month.  States he is noticed his anxiety symptoms have been worse since he stopped drinking.  He states he is a worrier and over thinker.  He does a lot of what if type thinking.  States that this interrupts his sleep as he lays in bed and thinks about these type of things.  States he has a lot of social anxiety.  He states if he goes into a social situation that his heart rate will increase, he will stutter, and feel tense.  States he also has a lot of irritability.  Denies any history of any panic attacks.  He states his appetite is been decreased as well.  Discussed with patient if he felt he was depressed and he states he does not.  He states he thinks that his anxiety makes it look like he  is depressed.  Discussed patient's PHQ 9 form with him.  Patient states that he wants to go out and do things but his isolation is tied to anxiety preventing him from doing those things.  He denies any suicidal or homicidal ideation.  States he really does not have any history of depression it is more anxiety.  Denies any manic type symptoms.  Denies any auditory or visual hallucinations.  Denies any paranoia.    The following portions of the patient's history were reviewed and updated as appropriate: allergies, current medications, past family history, past medical history, past social history, past surgical history and problem list.    Past Psychiatric History: Patient denies any previous psychiatric history.  Denies any inpatient psychiatric hospitalizations.  Denies any history of suicide attempts.    Family Psychiatric History: Sister has bipolar disorder.  No suicides among first-degree relatives.    Substance Use History: Patient has a history of drinking alcohol on a daily basis.  States in the recent past he has only been drinking once per month.  Denies any other drug use.    Past Medical History:  History reviewed. No pertinent past medical history.    Social History: Patient was born and raised in [place], [place].  Moved to [place] to be with his baby's mother.  Currently is in [place], [place].  Patient was raised by his mother.  He has a sister and 2 brothers.  Denies any history of abuse.  He has some college education.  He is recently laid off from [employer].  He is currently single with 2 children.  Both are boys ages 4 and 7.  Denies any current legal issues.  Hobbies include video games.  Social History     Socioeconomic History   • Marital status: Single   Tobacco Use   • Smoking status: Never Smoker   • Smokeless tobacco: Never Used   Substance and Sexual Activity   • Alcohol use: Yes     Comment: Occasionally Roughly a pint of liquor a week   • Drug use: Yes     Types: Marijuana     Comment:  occasional   • Sexual activity: Yes     Partners: Female     Birth control/protection: None     Comment: 7 female partners in the last year       Family History:  Family History   Problem Relation Age of Onset   • Stroke Mother         multiple   • Cancer Father         Unsure wether colon or prostate   • No Known Problems Sister    • No Known Problems Brother    • No Known Problems Son    • Cancer Maternal Grandmother         Unsure specifics   • Cancer Maternal Grandfather         Unsure Specifics   • Other Paternal Grandmother         possible brain tumor   • Other Paternal Grandfather         unknown   • No Known Problems Son        Past Surgical History:  Past Surgical History:   Procedure Laterality Date   • WISDOM TOOTH EXTRACTION  2014       Problem List:  Patient Active Problem List   Diagnosis   • High risk heterosexual behavior   • Mass of skin of abdomen   • Painless rectal bleeding   • Multiple lipomas   • Exposure to STD   • Social anxiety disorder   • Generalized anxiety disorder       Allergy:   No Known Allergies     Current Medications:   Current Outpatient Medications   Medication Sig Dispense Refill   • propranolol (INDERAL) 10 MG tablet Take 1 tablet by mouth 2 (Two) Times a Day As Needed (anxiety). 60 tablet 0   • sertraline (Zoloft) 50 MG tablet Take 0.5 tablets by mouth Daily for 7 days, THEN 1 tablet Daily for 23 days. 27 tablet 0     No current facility-administered medications for this visit.       Review of Symptoms:    Review of Systems   Constitutional: Negative.    HENT: Negative.    Eyes: Negative.    Respiratory: Negative.    Cardiovascular: Negative.    Gastrointestinal: Negative.    Endocrine: Negative.    Genitourinary: Negative.    Musculoskeletal: Negative.    Skin: Negative.    Allergic/Immunologic: Negative.    Neurological: Negative.    Hematological: Negative.    Psychiatric/Behavioral: The patient is nervous/anxious.          Physical Exam:   There were no vitals taken for  this visit.    Appearance: Normal  Gait, Station, Strength: Within normal limits    Mental Status Exam:   Hygiene:   good  Cooperation:  Cooperative  Eye Contact:  Good  Psychomotor Behavior:  Appropriate  Affect:  Full range  Mood: anxious  Hopelessness: Denies  Speech:  Normal  Thought Process:  Goal directed  Thought Content:  Normal  Suicidal:  None  Homicidal:  None  Hallucinations:  None  Delusion:  None  Memory:  Intact  Orientation:  Person, Place, Time and Situation  Reliability:  good  Insight:  Good  Judgement:  Good  Impulse Control:  Good    PHQ-9 Depression Screening  Little interest or pleasure in doing things?     Feeling down, depressed, or hopeless?     Trouble falling or staying asleep, or sleeping too much?     Feeling tired or having little energy?     Poor appetite or overeating?     Feeling bad about yourself - or that you are a failure or have let yourself or your family down?     Trouble concentrating on things, such as reading the newspaper or watching television?     Moving or speaking so slowly that other people could have noticed? Or the opposite - being so fidgety or restless that you have been moving around a lot more than usual?     Thoughts that you would be better off dead, or of hurting yourself in some way?     PHQ-9 Total Score     If you checked off any problems, how difficult have these problems made it for you to do your work, take care of things at home, or get along with other people?          PHQ-9 Total Score:       URSULA 7 anxiety screening tool that patient filled out virtually reviewed by this APRN at today's encounter.    PROMIS scale screening tool that patient filled out virtually reviewed by this APRN at today's encounter.    [facility] request number [id number] reviewed by this APRN at today's encounter.    Previous Provider notes and available records reviewed by this APRN today.     Lab Results:   Office Visit on 03/04/2022   Component Date Value Ref Range Status   •  Color 03/04/2022 Yellow  Yellow, Straw, Dark Yellow, [place] Final   • Clarity, UA 03/04/2022 Clear  Clear Final   • Specific Gravity  03/04/2022 1.010  1.005 - 1.030 Final   • pH, Urine 03/04/2022 6.5  5.0 - 8.0 Final   • Leukocytes 03/04/2022 Negative  Negative Final   • Nitrite, UA 03/04/2022 Negative  Negative Final   • Protein, POC 03/04/2022 Negative  Negative mg/dL Final   • Glucose, UA 03/04/2022 Negative  Negative, 1000 mg/dL (3+) mg/dL Final   • Ketones, UA 03/04/2022 Negative  Negative Final   • Urobilinogen, UA 03/04/2022 Normal  Normal Final   • Bilirubin 03/04/2022 Negative  Negative Final   • Blood, UA 03/04/2022 Negative  Negative Final   • Lot Number 03/04/2022 [id number]   Final   • Expiration Date 03/04/2022 10/21/2023   Final   Lab Requisition on 02/25/2022   Component Date Value Ref Range Status   • Case Report 02/25/2022    Final                    Value:Surgical Pathology Report                         Case: [id number]                                  Authorizing Provider:  Pablito Zarate MD    Collected:           02/25/2022 02:07 PM          Ordering Location:     [facility]   Received:            02/28/2022 09:13 AM                                 LABORATORY                                                                   Pathologist:           Erwin Wood MD                                                            Specimen:    Large Intestine, Sigmoid Colon                                                            • Clinical Information 02/25/2022    Final                    Value:This result contains rich text formatting which cannot be displayed here.   • Final Diagnosis 02/25/2022    Final                    Value:This result contains rich text formatting which cannot be displayed here.   • Gross Description 02/25/2022    Final                    Value:This result contains rich text formatting which cannot be displayed here.   • Microscopic Description  02/25/2022    Final                    Value:This result contains rich text formatting which cannot be displayed here.   Lab Requisition on 02/01/2022   Component Date Value Ref Range Status   • Case Report 02/01/2022    Final                    Value:Surgical Pathology Report                         Case: [id number]                                  Authorizing Provider:  Blake Cavanaugh MD    Collected:           02/01/2022 11:09 AM          Ordering Location:     [facility] [place] [facility]   Received:            02/01/2022 11:10 AM                                 LABORATORY                                                                   Pathologist:           Erwin Wood MD                                                            Specimens:   1) - Abdominal Wall, rt. lower quadrant                                                             2) - Abdominal Wall, lt. lower quadrant                                                             3) - Flank, left                                                                          • Clinical Information 02/01/2022    Final                    Value:This result contains rich text formatting which cannot be displayed here.   • Final Diagnosis 02/01/2022    Final                    Value:This result contains rich text formatting which cannot be displayed here.   • Gross Description 02/01/2022    Final                    Value:This result contains rich text formatting which cannot be displayed here.   • Microscopic Description 02/01/2022    Final                    Value:This result contains rich text formatting which cannot be displayed here.   Lab on 01/30/2022   Component Date Value Ref Range Status   • COVID19 01/30/2022 Not Detected  Not Detected - Ref. Range Final   Lab on 01/21/2022   Component Date Value Ref Range Status   • Endomysial IgA 01/21/2022 Negative  Negative Final   • Tissue Transglutaminase IgA 01/21/2022 <2  0 - 3 U/mL Final                                   Negative        0 -  3                                Weak Positive   4 - 10                                Positive           >10   Tissue Transglutaminase (tTG) has been identified   as the endomysial antigen.  Studies have demonstr-   ated that endomysial IgA antibodies have over 99%   specificity for gluten sensitive enteropathy.   • IgA 01/21/2022 167  90 - 386 mg/dL Final   Office Visit on 01/06/2022   Component Date Value Ref Range Status   • WBC 01/21/2022 5.76  3.40 - 10.80 10*3/mm3 Final   • RBC 01/21/2022 5.09  4.14 - 5.80 10*6/mm3 Final   • Hemoglobin 01/21/2022 15.9  13.0 - 17.7 g/dL Final   • Hematocrit 01/21/2022 46.7  37.5 - 51.0 % Final   • MCV 01/21/2022 91.7  79.0 - 97.0 fL Final   • MCH 01/21/2022 31.2  26.6 - 33.0 pg Final   • MCHC 01/21/2022 34.0  31.5 - 35.7 g/dL Final   • RDW 01/21/2022 12.2 (A) 12.3 - 15.4 % Final   • RDW-SD 01/21/2022 40.2  37.0 - 54.0 fl Final   • MPV 01/21/2022 9.0  6.0 - 12.0 fL Final   • Platelets 01/21/2022 275  140 - 450 10*3/mm3 Final   • Glucose 01/21/2022 57 (A) 65 - 99 mg/dL Final   • BUN 01/21/2022 13  6 - 20 mg/dL Final   • Creatinine 01/21/2022 0.79  0.76 - 1.27 mg/dL Final   • Sodium 01/21/2022 141  136 - 145 mmol/L Final   • Potassium 01/21/2022 4.0  3.5 - 5.2 mmol/L Final   • Chloride 01/21/2022 102  98 - 107 mmol/L Final   • CO2 01/21/2022 28.5  22.0 - 29.0 mmol/L Final   • Calcium 01/21/2022 9.8  8.6 - 10.5 mg/dL Final   • Total Protein 01/21/2022 7.7  6.0 - 8.5 g/dL Final   • Albumin 01/21/2022 5.00  3.50 - 5.20 g/dL Final   • ALT (SGPT) 01/21/2022 18  1 - 41 U/L Final   • AST (SGOT) 01/21/2022 19  1 - 40 U/L Final   • Alkaline Phosphatase 01/21/2022 54  39 - 117 U/L Final   • Total Bilirubin 01/21/2022 0.6  0.0 - 1.2 mg/dL Final   • eGFR   Amer 01/21/2022 136  >60 mL/min/1.73 Final   • Globulin 01/21/2022 2.7  gm/dL Final   • A/G Ratio 01/21/2022 1.9  g/dL Final   • BUN/Creatinine Ratio 01/21/2022 16.5  7.0 - 25.0 Final   • Anion  Gap 01/21/2022 10.5  5.0 - 15.0 mmol/L Final       Assessment/Plan   Problems Addressed this Visit        Mental Health    Social anxiety disorder    Relevant Medications    sertraline (Zoloft) 50 MG tablet    propranolol (INDERAL) 10 MG tablet    Generalized anxiety disorder - Primary    Relevant Medications    sertraline (Zoloft) 50 MG tablet      Diagnoses       Codes Comments    Generalized anxiety disorder    -  Primary ICD-10-CM: F41.1  ICD-9-CM: 300.02     Social anxiety disorder     ICD-10-CM: F40.10  ICD-9-CM: 300.23           Visit Diagnoses:    ICD-10-CM ICD-9-CM   1. Generalized anxiety disorder  F41.1 300.02   2. Social anxiety disorder  F40.10 300.23     Discussed treatment options with patient.  Discussed with patient that he has a generalized anxiety disorder and social anxiety disorder.  Spoke with him that depression seems to be closely tied to the anxiety, so we will evaluate that as we get his anxiety symptoms under control.  Patient agreeable.  Discussed with patient that SSRIs are first-line treatment for both depression and anxiety so that we will be the best medication to start with.  Mutually agreed to start Zoloft 25 mg daily for 7 days, then increase to 50 mg daily for anxiety.  Also discussed propanolol with the patient as it is good for social anxiety.  Informed him this is a blood pressure medication, so he should watch his blood pressure while on this medication.  Patient would like to try this medication as well.  Start propanolol 10 mg twice daily as needed for anxiety.  Patient would also like to see a therapist, so we will coordinate an appointment for him.  We will see patient again in 4 weeks to reassess.    TREATMENT PLAN/GOALS: Continue supportive psychotherapy efforts and medications as indicated. Treatment and medication options discussed during today's visit. Patient acknowledged and verbally consented to continue with current treatment plan and was educated on the  importance of compliance with treatment and follow-up appointments.    Short Term Goals: Patient will be compliant with medication, and patient will have no significant medication related side effects.  Patient will be engaged in psychotherapy as indicated.  Patient will report subjective improvement of symptoms.    Long term goals: To stabilize mood and treat/improve subjective symptoms, the patient will stay out of the hospital, the patient will be at an optimal level of functioning, and the patient will take all medications as prescribed.  The patient verbalized understanding and agreement with goals that were mutually set.    MEDICATION ISSUES:    Discussed medication options and treatment plan of prescribed medication as well as the risks, benefits, and side effects including potential falls, possible impaired driving and metabolic adversities among others. Patient is agreeable to call the office with any worsening of symptoms or onset of side effects. Patient is agreeable to call 911 or go to the nearest ER should he/she begin having SI/HI.     MEDS ORDERED DURING VISIT:  New Medications Ordered This Visit   Medications   • sertraline (Zoloft) 50 MG tablet     Sig: Take 0.5 tablets by mouth Daily for 7 days, THEN 1 tablet Daily for 23 days.     Dispense:  27 tablet     Refill:  0   • propranolol (INDERAL) 10 MG tablet     Sig: Take 1 tablet by mouth 2 (Two) Times a Day As Needed (anxiety).     Dispense:  60 tablet     Refill:  0       Return in about 4 weeks (around 5/10/2022) for Video visit.             This document has been electronically signed by KATIA Schwab  April 12, 2022 12:49 EDT    Part of this note may be an electronic transmission of spoken language to printed text using the Dragon Dictation System.

## 2022-05-09 DIAGNOSIS — F40.10 SOCIAL ANXIETY DISORDER: ICD-10-CM

## 2022-05-09 DIAGNOSIS — F41.1 GENERALIZED ANXIETY DISORDER: ICD-10-CM

## 2022-05-09 RX ORDER — PROPRANOLOL HYDROCHLORIDE 10 MG/1
10 TABLET ORAL 2 TIMES DAILY PRN
Qty: 60 TABLET | Refills: 0 | Status: SHIPPED | OUTPATIENT
Start: 2022-05-09 | End: 2022-10-24

## 2022-05-10 ENCOUNTER — TELEMEDICINE (OUTPATIENT)
Dept: PSYCHIATRY | Facility: CLINIC | Age: 37
End: 2022-05-10

## 2022-05-10 DIAGNOSIS — F40.10 SOCIAL ANXIETY DISORDER: Primary | ICD-10-CM

## 2022-05-10 DIAGNOSIS — F32.89 OTHER DEPRESSION: ICD-10-CM

## 2022-05-10 DIAGNOSIS — F41.1 GENERALIZED ANXIETY DISORDER: ICD-10-CM

## 2022-05-10 PROBLEM — F32.A DEPRESSION: Status: ACTIVE | Noted: 2022-05-10

## 2022-05-10 PROCEDURE — 99213 OFFICE O/P EST LOW 20 MIN: CPT

## 2022-05-10 RX ORDER — MIRTAZAPINE 15 MG/1
15 TABLET, FILM COATED ORAL NIGHTLY
Qty: 30 TABLET | Refills: 0 | Status: SHIPPED | OUTPATIENT
Start: 2022-05-10 | End: 2022-08-01

## 2022-05-10 RX ORDER — VENLAFAXINE HYDROCHLORIDE 37.5 MG/1
CAPSULE, EXTENDED RELEASE ORAL
Qty: 53 CAPSULE | Refills: 0 | Status: SHIPPED | OUTPATIENT
Start: 2022-05-10 | End: 2022-08-01

## 2022-05-10 NOTE — PROGRESS NOTES
This provider is located at [place], [place]. The Patient is seen remotely using Video. Patient is being seen via telehealth and confirm that they are in a secure environment for this session. Patient is located in [place], [place] at his home. The patient's condition being diagnosed/treated is appropriate for telemedicine. Provider identified as Dick Loaiza as well as credentials KATIA MSN PMHNP-BC.   The client/patient gave consent to be seen remotely, and when consent is given they understand that the consent allows for patient identifiable information to be sent to a third party as needed.  They may refuse to be seen remotely at any time. The electronic data is encrypted and password protected, and the patient has been advised of the potential risks to privacy not withstanding such measures.    Chief Complaint  Depression and Anxiety    Subjective        Grupo Sandoval presents to [facility] for   History of Present Illness  Patient seen today for a follow-up visit for anxiety and depression.  Patient reports that the Zoloft caused decreased libido.  He states he would like to come off this medication.  States in May he had noticed a little improvement with the Zoloft, but nothing substantial.  He currently rates his depression at 8 on a 1-10 scale with 10 being the worst.  States his sleep continues to be disrupted with worry and over thinking.  Appetite is fair with eating once per day.  Denies any suicidal or homicidal ideation.  Rates his anxiety at a 10 on a 1-10 scale with 10 being the worst.  States he continues to have issues with social anxiety.  States he has a lot of worry and over thinking.  He has trouble relaxing, and irritability.  Denies any panic.  Asked patient about the propanolol and he states he only took it a few times because he thought it was the culprit in his decreased libido.  Denies any paranoia.  Denies any auditory or visual hallucinations.   Denies any manic type symptoms.  Denies any suicidal or homicidal ideation.  Objective   Vital Signs:   There were no vitals taken for this visit.      PHQ-9 Score:   PHQ-9 Total Score: (P) 20     Mental Status Exam:   Hygiene:   good  Cooperation:  Cooperative  Eye Contact:  Good  Psychomotor Behavior:  Appropriate  Affect:  Full range  Mood: depressed and anxious  Speech:  Normal  Thought Process:  Goal directed  Thought Content:  Normal  Suicidal:  None  Homicidal:  None  Hallucinations:  None  Delusion:  None  Memory:  Intact  Orientation:  Person, Place, Time and Situation  Reliability:  good  Insight:  Good  Judgement:  Good  Impulse Control:  Good  Physical/Medical Issues:  No      PHQ-9 Depression Screening  Little interest or pleasure in doing things? (P) 3   Feeling down, depressed, or hopeless? (P) 3   Trouble falling or staying asleep, or sleeping too much? (P) 3   Feeling tired or having little energy? (P) 3   Poor appetite or overeating? (P) 3   Feeling bad about yourself - or that you are a failure or have let yourself or your family down? (P) 1   Trouble concentrating on things, such as reading the newspaper or watching television? (P) 3   Moving or speaking so slowly that other people could have noticed? Or the opposite - being so fidgety or restless that you have been moving around a lot more than usual? (P) 1   Thoughts that you would be better off dead, or of hurting yourself in some way? (P) 0   PHQ-9 Total Score (P) 20   If you checked off any problems, how difficult have these problems made it for you to do your work, take care of things at home, or get along with other people? (P) Very difficult     PHQ-9 Total Score: (P) 20    URSULA 7 anxiety screening tool that patient filled out virtually reviewed by this APRN at today's encounter.    PROMIS scale screening tool that patient filled out virtually reviewed by this APRN at today's encounter.    Previous Provider notes and available records  reviewed by this APRN today.   Current Medications:   Current Outpatient Medications   Medication Sig Dispense Refill   • mirtazapine (Remeron) 15 MG tablet Take 1 tablet by mouth Every Night for 30 days. 30 tablet 0   • propranolol (INDERAL) 10 MG tablet TAKE 1 TABLET BY MOUTH 2 (TWO) TIMES A DAY AS NEEDED (ANXIETY). 60 tablet 0   • venlafaxine XR (Effexor XR) 37.5 MG 24 hr capsule Take 1 capsule by mouth Daily for 7 days, THEN 2 capsules Daily for 23 days. 53 capsule 0     No current facility-administered medications for this visit.       Physical Exam   Result Review :    The following data was reviewed by: KATIA Schwab on 05/10/2022:  Common labs    Common Labsle 1/21/22 1/21/22    [id number]   Glucose  57 (A)   BUN  13   Creatinine  0.79   eGFR African Am  136   Sodium  141   Potassium  4.0   Chloride  102   Calcium  9.8   Albumin  5.00   Total Bilirubin  0.6   Alkaline Phosphatase  54   AST (SGOT)  19   ALT (SGPT)  18   WBC 5.76    Hemoglobin 15.9    Hematocrit 46.7    Platelets 275    (A) Abnormal value               Assessment and Plan   Problem List Items Addressed This Visit        Mental Health    Social anxiety disorder - Primary    Relevant Medications    venlafaxine XR (Effexor XR) 37.5 MG 24 hr capsule    mirtazapine (Remeron) 15 MG tablet    Generalized anxiety disorder    Relevant Medications    venlafaxine XR (Effexor XR) 37.5 MG 24 hr capsule    mirtazapine (Remeron) 15 MG tablet    Depression    Relevant Medications    venlafaxine XR (Effexor XR) 37.5 MG 24 hr capsule    mirtazapine (Remeron) 15 MG tablet        Discussed treatment options with patient.  Informed him that decreased libido is a side effect of Zoloft.  Discussed with him that we will taper off of Zoloft by taking half a tablet of the 50 mg for 4 days.  Patient has a supply do so.  Discussed with patient that we can start Effexor XR 37.5 mg daily for 7 days, then increase to 75 mg daily for depression and anxiety.  Patient  agreeable.  Discussed patient's sleep and appetite with him.  Informed him we could start Remeron 15 mg nightly for depression, anxiety and sleep.  Informed him that it could also increase his appetite.  Patient states he would like to try the medication.  Start Remeron 15 mg nightly for depression/anxiety/sleep.  Encourage patient to also try the propanolol again, as it should not be contributing to the decreased libido.  Patient states he will.  Patient states he did miss the therapy appointment due to miscommunication and would like to get that rescheduled.  We will coordinate an appointment for him.  We will see patient again in 4 weeks.    TREATMENT PLAN/GOALS: Continue supportive psychotherapy efforts and medications as indicated. Treatment and medication options discussed during today's visit. Patient ackowledged and verbally consented to continue with current treatment plan and was educated on the importance of compliance with treatment and follow-up appointments.    DEPRESSION:  Patient screened positive for depression based on a PHQ-9 score of  on . Follow-up recommendations include: Prescribed antidepressant medication treatment and Referral to Social Work.       MEDICATION ISSUES:  We discussed risks, benefits, and side effects of the above medications and the patient was agreeable with the plan. Patient was educated on the importance of compliance with treatment and follow-up appointments.  Patient is agreeable to call the office with any worsening of symptoms or onset of side effects. Patient is agreeable to call 911 or go to the nearest ER should he/she begin having SI/HI.      Counseled patient regarding multimodal approach with healthy nutrition, healthy sleep, regular physical activity, social activities, counseling, and medications.      Coping skills reviewed and encouraged positive framing of thoughts     Assisted patient in processing above session content; acknowledged and normalized patient’s  thoughts, feelings, and concerns.  Applied  positive coping skills and behavior management in session.  Allowed patient to freely discuss issues without interruption or judgment. Provided safe, confidential environment to facilitate the development of positive therapeutic relationship and encourage open, honest communication. Assisted patient in identifying risk factors which would indicate the need for higher level of care including thoughts to harm self or others and/or self-harming behavior and encouraged patient to contact this office, call 911, or present to the nearest emergency room should any of these events occur. Discussed crisis intervention services and means to access.     MEDS ORDERED DURING VISIT:  New Medications Ordered This Visit   Medications   • venlafaxine XR (Effexor XR) 37.5 MG 24 hr capsule     Sig: Take 1 capsule by mouth Daily for 7 days, THEN 2 capsules Daily for 23 days.     Dispense:  53 capsule     Refill:  0   • mirtazapine (Remeron) 15 MG tablet     Sig: Take 1 tablet by mouth Every Night for 30 days.     Dispense:  30 tablet     Refill:  0         Follow Up   Return in about 4 weeks (around 6/7/2022) for Video visit.    Patient was given instructions and counseling regarding his condition or for health maintenance advice. Please see specific information pulled into the AVS if appropriate.         This document has been electronically signed by KATIA Schwab  May 10, 2022 10:25 EDT    Part of this note may be an electronic transcription/translation of spoken language to printed text using the Dragon Dictation System.

## 2022-06-07 DIAGNOSIS — F41.1 GENERALIZED ANXIETY DISORDER: ICD-10-CM

## 2022-06-07 DIAGNOSIS — F32.89 OTHER DEPRESSION: ICD-10-CM

## 2022-06-07 DIAGNOSIS — F40.10 SOCIAL ANXIETY DISORDER: ICD-10-CM

## 2022-06-07 RX ORDER — VENLAFAXINE HYDROCHLORIDE 37.5 MG/1
CAPSULE, EXTENDED RELEASE ORAL
Qty: 53 CAPSULE | Refills: 0 | OUTPATIENT
Start: 2022-06-07 | End: 2022-07-07

## 2022-06-14 DIAGNOSIS — F40.10 SOCIAL ANXIETY DISORDER: ICD-10-CM

## 2022-06-14 DIAGNOSIS — F32.89 OTHER DEPRESSION: ICD-10-CM

## 2022-06-14 DIAGNOSIS — F41.1 GENERALIZED ANXIETY DISORDER: ICD-10-CM

## 2022-06-14 RX ORDER — MIRTAZAPINE 15 MG/1
TABLET, FILM COATED ORAL
Qty: 30 TABLET | Refills: 0 | OUTPATIENT
Start: 2022-06-14

## 2022-07-16 ENCOUNTER — OFFICE VISIT (OUTPATIENT)
Dept: FAMILY MEDICINE CLINIC | Facility: CLINIC | Age: 37
End: 2022-07-16

## 2022-07-16 VITALS
HEIGHT: 70 IN | HEART RATE: 89 BPM | SYSTOLIC BLOOD PRESSURE: 124 MMHG | OXYGEN SATURATION: 99 % | WEIGHT: 199 LBS | DIASTOLIC BLOOD PRESSURE: 76 MMHG | TEMPERATURE: 98 F | RESPIRATION RATE: 16 BRPM | BODY MASS INDEX: 28.49 KG/M2

## 2022-07-16 DIAGNOSIS — R14.0 BLOATING: ICD-10-CM

## 2022-07-16 DIAGNOSIS — K63.5 MULTIPLE POLYPS OF SIGMOID COLON: ICD-10-CM

## 2022-07-16 DIAGNOSIS — K46.9 ABDOMINAL HERNIA WITHOUT OBSTRUCTION AND WITHOUT GANGRENE, RECURRENCE NOT SPECIFIED, UNSPECIFIED HERNIA TYPE: Primary | ICD-10-CM

## 2022-07-16 PROCEDURE — 99214 OFFICE O/P EST MOD 30 MIN: CPT | Performed by: STUDENT IN AN ORGANIZED HEALTH CARE EDUCATION/TRAINING PROGRAM

## 2022-07-16 NOTE — PROGRESS NOTES
"Chief Complaint  GI Problem (Lump in lower abdomen.)    History of Present Illness     Gi issue  He had his lipomas removed.   He says he has a bulge the top of his stomach. He noticed this in Jan. It has not worsened. It feels worse after eating. He says he has had no further blood in stool. He has regular bms without diarrhea or constipation. No fever.    Multiple polyps  They were precancerous. Repeat recommended in 6 months (done end Feb)      The following portions of the patient's history were reviewed and updated as appropriate: allergies, current medications, past family history, past medical history, past social history, past surgical history, and problem list.    OBJECTIVE:  /76   Pulse 89   Temp 98 °F (36.7 °C)   Resp 16   Ht 177.8 cm (70\")   Wt 90.3 kg (199 lb)   SpO2 99%   BMI 28.55 kg/m²       Physical Exam  Constitutional:       General: He is not in acute distress.     Appearance: Normal appearance.   HENT:      Head: Normocephalic and atraumatic.   Eyes:      Extraocular Movements: Extraocular movements intact.   Cardiovascular:      Rate and Rhythm: Normal rate and regular rhythm.      Heart sounds: No murmur heard.  Pulmonary:      Effort: Pulmonary effort is normal. No respiratory distress.      Breath sounds: Normal breath sounds.   Abdominal:      General: Abdomen is flat. Bowel sounds are normal. There is no distension.      Palpations: Abdomen is soft.      Tenderness: There is no abdominal tenderness. There is no guarding or rebound.      Comments: Feels as though there is a ventral hernia present   Musculoskeletal:         General: No swelling.      Cervical back: Normal range of motion.   Skin:     Findings: No rash.   Neurological:      General: No focal deficit present.      Mental Status: He is alert.   Psychiatric:         Mood and Affect: Mood normal.                    Assessment and Plan   Diagnoses and all orders for this visit:    1. Abdominal hernia without " obstruction and without gangrene, recurrence not specified, unspecified hernia type (Primary)  -     CT Abdomen Pelvis Without Contrast; Future    2. Multiple polyps of sigmoid colon  -     Ambulatory Referral to Genetics      -     Ambulatory Referral to Gastroenterology      Obtain ct to rule out any compromise of hernia given discomfort after eating.   Referred to genetics.   Refer to gi given multiple colonic polyps. He was recommended colonoscopy 6 months after first, ordered. Add egd given bloating.    Advised to go to er for any fever or pain.     Return in about 3 weeks (around 8/6/2022).       Becky Combs D.O.  [facility]

## 2022-07-22 ENCOUNTER — HOSPITAL ENCOUNTER (OUTPATIENT)
Dept: CT IMAGING | Facility: HOSPITAL | Age: 37
Discharge: HOME | End: 2022-07-22
Admitting: STUDENT IN AN ORGANIZED HEALTH CARE EDUCATION/TRAINING PROGRAM

## 2022-07-22 DIAGNOSIS — K46.9 ABDOMINAL HERNIA WITHOUT OBSTRUCTION AND WITHOUT GANGRENE, RECURRENCE NOT SPECIFIED, UNSPECIFIED HERNIA TYPE: ICD-10-CM

## 2022-07-22 PROCEDURE — 74176 CT ABD & PELVIS W/O CONTRAST: CPT

## 2022-08-01 ENCOUNTER — OFFICE VISIT (OUTPATIENT)
Dept: FAMILY MEDICINE CLINIC | Facility: CLINIC | Age: 37
End: 2022-08-01

## 2022-08-01 VITALS
TEMPERATURE: 98 F | HEART RATE: 80 BPM | OXYGEN SATURATION: 98 % | BODY MASS INDEX: 28.63 KG/M2 | HEIGHT: 70 IN | DIASTOLIC BLOOD PRESSURE: 74 MMHG | WEIGHT: 200 LBS | RESPIRATION RATE: 16 BRPM | SYSTOLIC BLOOD PRESSURE: 116 MMHG

## 2022-08-01 DIAGNOSIS — K21.9 GASTROESOPHAGEAL REFLUX DISEASE, UNSPECIFIED WHETHER ESOPHAGITIS PRESENT: ICD-10-CM

## 2022-08-01 DIAGNOSIS — K46.9 ABDOMINAL HERNIA WITHOUT OBSTRUCTION AND WITHOUT GANGRENE, RECURRENCE NOT SPECIFIED, UNSPECIFIED HERNIA TYPE: Primary | ICD-10-CM

## 2022-08-01 DIAGNOSIS — R14.0 BLOATING: Primary | ICD-10-CM

## 2022-08-01 PROCEDURE — 99214 OFFICE O/P EST MOD 30 MIN: CPT | Performed by: STUDENT IN AN ORGANIZED HEALTH CARE EDUCATION/TRAINING PROGRAM

## 2022-08-01 RX ORDER — PANTOPRAZOLE SODIUM 40 MG/1
40 TABLET, DELAYED RELEASE ORAL DAILY
Qty: 30 TABLET | Refills: 1 | Status: SHIPPED | OUTPATIENT
Start: 2022-08-01 | End: 2022-10-24 | Stop reason: SDUPTHER

## 2022-08-01 NOTE — PROGRESS NOTES
"Chief Complaint  Hernia (3 wk f/u)    History of Present Illness    Stomach bulge  He says he feels okay.   He doesn't have abdominal pain  No nausea   No black or bloody stool at this time.   No constipation.   No difficulty swallowing.   No belly pain after eating.   He does have reflux and heart burn \"all the time\". He says it is almost every day.   He has bms daily.   Colonoscopy and egd were ordered and he says this is scheduled.   He was referred to gi.       The following portions of the patient's history were reviewed and updated as appropriate: allergies, current medications, past family history, past medical history, past social history, past surgical history, and problem list.    OBJECTIVE:  /74   Pulse 80   Temp 98 °F (36.7 °C)   Resp 16   Ht 177.8 cm (70\")   Wt 90.7 kg (200 lb)   SpO2 98%   BMI 28.70 kg/m²       Physical Exam  Constitutional:       General: He is not in acute distress.     Appearance: Normal appearance.   HENT:      Head: Normocephalic and atraumatic.   Eyes:      Extraocular Movements: Extraocular movements intact.   Cardiovascular:      Rate and Rhythm: Normal rate and regular rhythm.      Heart sounds: No murmur heard.  Pulmonary:      Effort: Pulmonary effort is normal. No respiratory distress.      Breath sounds: Normal breath sounds.   Abdominal:      General: Abdomen is flat. Bowel sounds are normal. There is no distension.      Palpations: Abdomen is soft.      Tenderness: There is no abdominal tenderness. There is no guarding or rebound.      Hernia: A hernia is present.   Musculoskeletal:         General: No swelling.      Cervical back: Normal range of motion.   Skin:     Findings: No rash.   Neurological:      General: No focal deficit present.      Mental Status: He is alert. Mental status is at baseline.   Psychiatric:         Mood and Affect: Mood normal.                    Assessment and Plan   Diagnoses and all orders for this visit:    1. Abdominal hernia " without obstruction and without gangrene, recurrence not specified, unspecified hernia type (Primary)  -     Ambulatory Referral to General Surgery    2. Gastroesophageal reflux disease, unspecified whether esophagitis present  -     H. Pylori Antigen, Stool - Stool, Per Rectum; Future    Other orders  -     pantoprazole (PROTONIX) 40 MG EC tablet; Take 1 tablet by mouth Daily.  Dispense: 30 tablet; Refill: 1      egd and colo scheduled.   He would like to see general surgery for the hernia.   Will start on protonix for gerd. Rule out h pylori. Advised to give stool sample before starting medication.   Advised to seek care immediately for any abdominal pain bloody/black stool inability to have a bm.    Return in about 7 weeks (around 9/19/2022).       Becky Combs D.O.  [facility]

## 2022-08-16 ENCOUNTER — LAB (OUTPATIENT)
Dept: LAB | Facility: HOSPITAL | Age: 37
End: 2022-08-16

## 2022-08-16 DIAGNOSIS — K21.9 GASTROESOPHAGEAL REFLUX DISEASE, UNSPECIFIED WHETHER ESOPHAGITIS PRESENT: ICD-10-CM

## 2022-08-16 PROCEDURE — 87338 HPYLORI STOOL AG IA: CPT

## 2022-08-19 DIAGNOSIS — Z12.11 ENCOUNTER FOR SCREENING COLONOSCOPY: Primary | ICD-10-CM

## 2022-08-19 LAB — H PYLORI AG STL QL IA: NEGATIVE

## 2022-09-07 ENCOUNTER — OUTSIDE FACILITY SERVICE (OUTPATIENT)
Dept: GASTROENTEROLOGY | Facility: CLINIC | Age: 37
End: 2022-09-07

## 2022-09-07 PROCEDURE — 45385 COLONOSCOPY W/LESION REMOVAL: CPT | Performed by: INTERNAL MEDICINE

## 2022-09-07 PROCEDURE — 88305 TISSUE EXAM BY PATHOLOGIST: CPT | Performed by: INTERNAL MEDICINE

## 2022-09-07 PROCEDURE — 45380 COLONOSCOPY AND BIOPSY: CPT | Performed by: INTERNAL MEDICINE

## 2022-09-07 PROCEDURE — 45388 COLONOSCOPY W/ABLATION: CPT | Performed by: INTERNAL MEDICINE

## 2022-09-08 ENCOUNTER — LAB REQUISITION (OUTPATIENT)
Dept: LAB | Facility: HOSPITAL | Age: 37
End: 2022-09-08

## 2022-09-08 DIAGNOSIS — K63.5 POLYP OF COLON: ICD-10-CM

## 2022-09-08 DIAGNOSIS — K63.89 OTHER SPECIFIED DISEASES OF INTESTINE: ICD-10-CM

## 2022-09-08 DIAGNOSIS — D12.5 BENIGN NEOPLASM OF SIGMOID COLON: ICD-10-CM

## 2022-09-08 DIAGNOSIS — K64.8 OTHER HEMORRHOIDS: ICD-10-CM

## 2022-09-08 DIAGNOSIS — Z86.010 PERSONAL HISTORY OF COLONIC POLYPS: ICD-10-CM

## 2022-09-08 DIAGNOSIS — D12.7 BENIGN NEOPLASM OF RECTOSIGMOID JUNCTION: ICD-10-CM

## 2022-09-08 DIAGNOSIS — D12.3 BENIGN NEOPLASM OF TRANSVERSE COLON: ICD-10-CM

## 2022-09-08 DIAGNOSIS — D12.2 BENIGN NEOPLASM OF ASCENDING COLON: ICD-10-CM

## 2022-09-27 ENCOUNTER — TELEPHONE (OUTPATIENT)
Dept: FAMILY MEDICINE CLINIC | Facility: CLINIC | Age: 37
End: 2022-09-27

## 2022-10-24 ENCOUNTER — OFFICE VISIT (OUTPATIENT)
Dept: FAMILY MEDICINE CLINIC | Facility: CLINIC | Age: 37
End: 2022-10-24

## 2022-10-24 VITALS
TEMPERATURE: 98.4 F | SYSTOLIC BLOOD PRESSURE: 116 MMHG | BODY MASS INDEX: 28 KG/M2 | HEIGHT: 70 IN | DIASTOLIC BLOOD PRESSURE: 72 MMHG | OXYGEN SATURATION: 99 % | WEIGHT: 195.6 LBS | HEART RATE: 70 BPM | RESPIRATION RATE: 15 BRPM

## 2022-10-24 DIAGNOSIS — K21.9 GASTROESOPHAGEAL REFLUX DISEASE, UNSPECIFIED WHETHER ESOPHAGITIS PRESENT: ICD-10-CM

## 2022-10-24 DIAGNOSIS — R30.0 DYSURIA: Primary | ICD-10-CM

## 2022-10-24 DIAGNOSIS — Z20.2 POSSIBLE EXPOSURE TO STD: ICD-10-CM

## 2022-10-24 LAB
BILIRUB BLD-MCNC: NEGATIVE MG/DL
CLARITY, POC: ABNORMAL
COLOR UR: YELLOW
EXPIRATION DATE: ABNORMAL
GLUCOSE UR STRIP-MCNC: NEGATIVE MG/DL
KETONES UR QL: NEGATIVE
LEUKOCYTE EST, POC: NEGATIVE
Lab: ABNORMAL
NITRITE UR-MCNC: NEGATIVE MG/ML
PH UR: 6 [PH] (ref 5–8)
PROT UR STRIP-MCNC: NEGATIVE MG/DL
RBC # UR STRIP: NEGATIVE /UL
SP GR UR: 1.03 (ref 1–1.03)
UROBILINOGEN UR QL: ABNORMAL

## 2022-10-24 PROCEDURE — 99214 OFFICE O/P EST MOD 30 MIN: CPT | Performed by: STUDENT IN AN ORGANIZED HEALTH CARE EDUCATION/TRAINING PROGRAM

## 2022-10-24 RX ORDER — PANTOPRAZOLE SODIUM 40 MG/1
40 TABLET, DELAYED RELEASE ORAL DAILY
Qty: 30 TABLET | Refills: 1 | Status: SHIPPED | OUTPATIENT
Start: 2022-10-24 | End: 2022-11-09 | Stop reason: SDUPTHER

## 2022-10-24 NOTE — PROGRESS NOTES
"Chief Complaint  burning while urinating    History of Present Illness     He had repeat colo. He was seen by general surgery for his abdominal wall pain surgery was not recommended at this time.     He did not take the ppi.     He says he had some burning after urination. No discharge or fever. No bloody urine. No known exposure to sti but is agreeable to screening.    The following portions of the patient's history were reviewed and updated as appropriate: allergies, current medications, past family history, past medical history, past social history, past surgical history, and problem list.    OBJECTIVE:  /72   Pulse 70   Temp 98.4 °F (36.9 °C)   Resp 15   Ht 177.8 cm (70\")   Wt 88.7 kg (195 lb 9.6 oz)   SpO2 99%   BMI 28.07 kg/m²       Physical Exam  Constitutional:       General: He is not in acute distress.     Appearance: Normal appearance.   HENT:      Head: Normocephalic and atraumatic.   Eyes:      Extraocular Movements: Extraocular movements intact.   Cardiovascular:      Rate and Rhythm: Normal rate and regular rhythm.      Heart sounds: No murmur heard.  Pulmonary:      Effort: Pulmonary effort is normal. No respiratory distress.      Breath sounds: Normal breath sounds. No stridor. No wheezing, rhonchi or rales.   Skin:     Findings: No rash.   Neurological:      General: No focal deficit present.      Mental Status: He is alert.   Psychiatric:         Mood and Affect: Mood normal.                    Assessment and Plan   Diagnoses and all orders for this visit:    1. Dysuria (Primary)  -     POC Urinalysis Dipstick, Automated    2. Gastroesophageal reflux disease, unspecified whether esophagitis present    3. Possible exposure to STD  -     RPR; Future  -     HIV-1 / O / 2 Ag / Antibody 4th Generation; Future  -     Hepatitis B Virus (HBV) Screening and Diagnosis; Future  -     Hepatitis C Antibody; Future  -     Cancel: OneSwab - Kit, Urine, Clean Catch; Future  -     OneSwab - Kit, " Urine, Clean Catch; Future    Other orders  -     pantoprazole (PROTONIX) 40 MG EC tablet; Take 1 tablet by mouth Daily.  Dispense: 30 tablet; Refill: 1      Start protonix.     Urinalysis looks okay follow up for recurrent symptoms.    Return in about 1 month (around 11/24/2022).       Becky Combs D.O.  [facility]

## 2022-11-09 RX ORDER — PANTOPRAZOLE SODIUM 40 MG/1
40 TABLET, DELAYED RELEASE ORAL DAILY
Qty: 30 TABLET | Refills: 1 | Status: SHIPPED | OUTPATIENT
Start: 2022-11-09 | End: 2022-11-14 | Stop reason: SDUPTHER

## 2022-11-09 NOTE — TELEPHONE ENCOUNTER
Rx Refill Note  Requested Prescriptions     Pending Prescriptions Disp Refills   • pantoprazole (PROTONIX) 40 MG EC tablet 30 tablet 1     Sig: Take 1 tablet by mouth Daily.      Last office visit with prescribing clinician: 10/24/2022      Next office visit with prescribing clinician: 11/28/2022            Dary Schroeder  11/09/22, 08:57 EST

## 2022-11-10 ENCOUNTER — LAB (OUTPATIENT)
Dept: LAB | Facility: HOSPITAL | Age: 37
End: 2022-11-10

## 2022-11-10 ENCOUNTER — CLINICAL SUPPORT (OUTPATIENT)
Dept: GENETICS | Facility: HOSPITAL | Age: 37
End: 2022-11-10

## 2022-11-10 DIAGNOSIS — K63.5 COLON POLYPOSIS: ICD-10-CM

## 2022-11-10 DIAGNOSIS — Z80.0 FAMILY HISTORY OF COLON CANCER: ICD-10-CM

## 2022-11-10 DIAGNOSIS — Z13.79 GENETIC TESTING: Primary | ICD-10-CM

## 2022-11-10 DIAGNOSIS — D12.6 ADENOMATOUS POLYP OF COLON, UNSPECIFIED PART OF COLON: ICD-10-CM

## 2022-11-10 PROCEDURE — 96040: CPT | Performed by: GENETIC COUNSELOR, MS

## 2022-11-10 NOTE — PROGRESS NOTES
Grupo Sandoval, a 37-year-old male, was referred for genetic counseling due to a personal history of colon polyps. Mr. Sandoval has had approximately 10 polyps removed over the last year. He had his first colonoscopy in February 2022 and his second colonoscopy in September 2022. He will have his next colonoscopy in one year. Mr. Sandoval was interested in discussing his risk for a hereditary cancer syndrome.  The CancerNext panel was ordered through [facility] which analyzes 36 genes associated with an increased cancer and polyposis risk. Results are expected in 2-3 weeks.     FAMILY HISTORY (see attached pedigree):  Father:   Colon cancer, 50  Pat. Grandmother: Brain tumor  Mat. Grandfather: Lymphoma  Mat. Grandmother: Unknown cancer type    We do not have medical records regarding the cancer diagnoses in Mr. Sandoval’s family.    RISK ASSESSMENT:  Mr. Sandoval’s personal history of colon polyps led to concern for a hereditary polyposis syndrome such as AFAP or MAP, which are caused by mutations in the APC and MUTYH gene, respectively.  He clearly meets NCCN guidelines criteria for APC/MUTYH testing based on his personal history of 10 or more colon polyps.  We discussed the availability of multigene panels that evaluate for APC and MUTYH mutations as well as mutations in a number of other cancer susceptibility genes simultaneously.  This risk assessment is based on the information available at the time of the appointment and may change if more information becomes available.      GENETIC COUNSELING: (30 minutes) We reviewed the family history information in detail. Cases of cancer follow three general patterns: sporadic, familial, and hereditary.  While most cancer is sporadic, some cases appear to occur in family clusters.  These cases are said to be familial and account for 10-20% of colon cancer cases.  Familial cases may be due to a combination of shared genes and environmental factors among family members.   In even fewer families, the cancer is hereditary, and the genes responsible for the increased risk for cancer are known.      Family histories typical of hereditary cancer syndromes usually include multiple first- and second-degree relatives diagnosed with cancer types that define a syndrome.  These cases tend to be diagnosed at younger-than-expected ages and can be bilateral or multifocal.  The cancer in these families follows an autosomal dominant inheritance pattern, which indicates the likely presence of a mutation in a cancer susceptibility gene.  Children and siblings of an individual believed to carry this mutation have a 50% chance of inheriting that mutation, thereby inheriting the increased risk to develop cancer.  These mutations can be passed down from the maternal or the paternal lineage.    We discussed familial adenomatous polyposis (FAP), which accounts for less than 1% of all colorectal cancers and is inherited in a dominant manner.  Typically, individuals with classic FAP have 100’s to 1000’s of colon polyps.  Attenuated FAP is a form of FAP associated with an average of 30 colon polyps and cancers diagnosed 10 years later than is typical for FAP.  FAP and AFAP are both caused by mutations in the APC gene.  FAP and AFAP are also associated with an increased risk to develop other types of cancer. These include cancer of the duodenum (4-12%), stomach (<1%), pancreas (<1%), thyroid (<2%) and a less than 1% risk for central nervous system cancers.  Additionally, duodenal and gastric polyps are seen in individuals with AFAP.  The cumulative colon cancer risk with AFAP is estimated to be ~70% by age 80 if the adenomatous polyps are not removed.  Given Mr. Sandoval’s personal history of polyps, another hereditary polyposis condition to be considered is MYH-associated polyposis (MAP). Individuals with MAP have characteristics that resemble AFAP. Unlike most hereditary cancer conditions, MAP is inherited in  an autosomal recessive manner. This means that in order to have the condition, an individual must inherit two non-working copies of the gene (mutations); one from each parent.     There are several other genes associated with hereditary cancer and polyposis syndromes; therefore, we discussed the option of pursuing a panel to test for these other genes as well. Some of these genes have well defined cancer risks and established management guidelines.  Other genes that can be tested for have been more recently described, and there may be less data regarding the risks and therefore may not have established management guidelines.  We discussed these limitations at length. Based on Mr. Sandoval’s personal history and his desire to learn more about his personal risks, he opted to pursue testing through a panel evaluating several other genes known to increase the risk for cancer.    GENETIC TESTING:  The risks, benefits and limitations of multigene panel testing and implications for clinical management following testing were reviewed.  DNA test results can influence decisions regarding screening, prevention and surgical management.  Genetic testing can have significant psychological implications for both individuals and families.  Also discussed was the law in place ([place]) to protect from the possibility of employment and insurance discrimination based on genetic test results, as well as limitations of this law.     The implications of a positive or negative test result were discussed.  We also discussed the possibility that, in some cases, genetic test results may be ambiguous due to the identification of a genetic variant of uncertain significance (VUS).  These variants may or may not be associated with an increased cancer risk. The limited value of negative test results was emphasized, particularly given the significant population risk for cancer and the possibility of other cancer susceptibility genes.      PLAN:    Results are expected in 2-3 weeks. He is welcome to contact us in the meantime with any questions or concerns at [phone].      Maria Elena Craven MS, [facility]  Licensed Certified Genetic Counselor

## 2022-11-14 ENCOUNTER — OFFICE VISIT (OUTPATIENT)
Dept: FAMILY MEDICINE CLINIC | Facility: CLINIC | Age: 37
End: 2022-11-14

## 2022-11-14 VITALS
HEIGHT: 70 IN | OXYGEN SATURATION: 98 % | SYSTOLIC BLOOD PRESSURE: 112 MMHG | RESPIRATION RATE: 22 BRPM | TEMPERATURE: 98.4 F | DIASTOLIC BLOOD PRESSURE: 82 MMHG | HEART RATE: 90 BPM | BODY MASS INDEX: 27.17 KG/M2 | WEIGHT: 189.8 LBS

## 2022-11-14 DIAGNOSIS — R30.0 DYSURIA: Primary | ICD-10-CM

## 2022-11-14 PROCEDURE — 99213 OFFICE O/P EST LOW 20 MIN: CPT | Performed by: STUDENT IN AN ORGANIZED HEALTH CARE EDUCATION/TRAINING PROGRAM

## 2022-11-14 RX ORDER — SULFAMETHOXAZOLE AND TRIMETHOPRIM 800; 160 MG/1; MG/1
1 TABLET ORAL 2 TIMES DAILY
Qty: 14 TABLET | Refills: 0 | Status: SHIPPED | OUTPATIENT
Start: 2022-11-14

## 2022-11-14 NOTE — TELEPHONE ENCOUNTER
Rx Refill Note  Requested Prescriptions     Pending Prescriptions Disp Refills   • pantoprazole (PROTONIX) 40 MG EC tablet 30 tablet 1     Sig: Take 1 tablet by mouth Daily.      Last office visit with prescribing clinician: 11/14/2022      Next office visit with prescribing clinician: 11/28/2022            Joe Hood MA  11/14/22, 16:16 EST

## 2022-11-14 NOTE — PROGRESS NOTES
"Chief Complaint  Difficulty Urinating (UNABLE TO LEAVE SAMPLE)    History of Present Illness     He says he has been having dysuria  He says last year when this happened he was given antibiotics which helped. It was bactrim.   No fever. No new back pain. No discharge. He is not sexually active. No penile rash.       The following portions of the patient's history were reviewed and updated as appropriate: allergies, current medications, past family history, past medical history, past social history, past surgical history, and problem list.    OBJECTIVE:  /82   Pulse 90   Temp 98.4 °F (36.9 °C)   Resp 22   Ht 177.8 cm (70\")   Wt 86.1 kg (189 lb 12.8 oz)   SpO2 98%   BMI 27.23 kg/m²       Physical Exam  Constitutional:       General: He is not in acute distress.     Appearance: Normal appearance.   HENT:      Head: Normocephalic and atraumatic.   Eyes:      Extraocular Movements: Extraocular movements intact.   Cardiovascular:      Rate and Rhythm: Normal rate and regular rhythm.      Heart sounds: No murmur heard.  Pulmonary:      Effort: Pulmonary effort is normal. No respiratory distress.      Breath sounds: Normal breath sounds. No stridor. No wheezing, rhonchi or rales.   Abdominal:      General: Bowel sounds are normal. There is no distension.      Palpations: Abdomen is soft.      Tenderness: There is no abdominal tenderness. There is no right CVA tenderness, left CVA tenderness, guarding or rebound.   Skin:     Findings: No rash.   Neurological:      General: No focal deficit present.      Mental Status: He is alert.   Psychiatric:         Mood and Affect: Mood normal.                    Assessment and Plan   Diagnoses and all orders for this visit:    1. Dysuria (Primary)  Assessment & Plan:  He is unable to leave sample today. Will go ahead and treat with bactrim. Advised him to let me know if symptoms worsen or do not improve.      Other orders  -     sulfamethoxazole-trimethoprim (Bactrim DS) " 800-160 MG per tablet; Take 1 tablet by mouth 2 (Two) Times a Day.  Dispense: 14 tablet; Refill: 0        Return if symptoms worsen or fail to improve, for Next scheduled follow up.       Becky Combs D.O.  [facility] [place]

## 2022-11-14 NOTE — ASSESSMENT & PLAN NOTE
He is unable to leave sample today. Will go ahead and treat with bactrim. Advised him to let me know if symptoms worsen or do not improve.

## 2022-11-15 RX ORDER — PANTOPRAZOLE SODIUM 40 MG/1
40 TABLET, DELAYED RELEASE ORAL DAILY
Qty: 30 TABLET | Refills: 1 | Status: SHIPPED | OUTPATIENT
Start: 2022-11-15

## 2022-12-06 ENCOUNTER — DOCUMENTATION (OUTPATIENT)
Dept: GENETICS | Facility: HOSPITAL | Age: 37
End: 2022-12-06

## 2022-12-15 ENCOUNTER — TELEPHONE (OUTPATIENT)
Dept: FAMILY MEDICINE CLINIC | Facility: CLINIC | Age: 37
End: 2022-12-15

## 2022-12-20 NOTE — PROGRESS NOTES
Grupo Sandoval, a 37-year-old male, was referred for genetic counseling due to a personal history of colon polyps. Mr. Sandoval has had approximately 10 polyps removed over the last year. He had his first colonoscopy in February 2022 and his second colonoscopy in September 2022. He will have his next colonoscopy in one year. Mr. Sandoval was interested in discussing his risk for a hereditary cancer syndrome.  The CancerNext panel was ordered through [employer] which analyzes 36 genes associated with an increased cancer and polyposis risk. The genes on this panel include APC, ANIVAL, AXIN2, BARD1, BMPR1A, BRCA1, BRCA2, BRIP1, CDH1, CDK4, CDKN2A, CHEK2, DICER1, EPCAM, GREM1, HOXB13, MLH1, MSH2, MSH3, MSH6, MUTYH, NBN, NF1, NTHL1, PALB2, PMS2, POLD1, POLE, PTEN, RAD51C, RAD51D, RECQL, SMAD4, SMARCA4, STK11, and TP53. Genetic testing was negative for known pathogenic (harmful) mutations in the 36 genes on the CancerNext panel (see attached). While no known pathogenic mutations were identified, a variant of uncertain significance was identified in the NF1 gene and a variant of uncertain significance was identified in the SMARCA4 gene. Variants are changes in DNA that may or may not affect the function of the gene. The classification of a variant to either a benign gene change or pathogenic mutation depends on a number of factors. The majority (estimated to be >90%) of VUS’s are eventually reclassified as benign gene changes. It is not recommended that any unaffected relatives be tested for these variants at this time, and management should not be altered based on this variant.  Management should be guided by family history assessments. These results were discussed with Mr. Sandoval by telephone on 12/6/2022.    FAMILY HISTORY (see attached pedigree):  Father:   Colon cancer, 50  Pat. Grandmother: Brain tumor  Mat. Grandfather: Lymphoma  Mat. Grandmother: Unknown cancer type    We do not have medical records regarding the  cancer diagnoses in Mr. Sandoval’s family.    RISK ASSESSMENT:  Mr. Sandoval’s personal history of colon polyps led to concern for a hereditary polyposis syndrome such as AFAP or MAP, which are caused by mutations in the APC and MUTYH gene, respectively.  He clearly meets NCCN guidelines criteria for APC/MUTYH testing based on his personal history of 10 or more colon polyps.  We discussed the availability of multigene panels that evaluate for APC and MUTYH mutations as well as mutations in a number of other cancer susceptibility genes simultaneously.  This risk assessment is based on the information available at the time of the appointment and may change if more information becomes available.      GENETIC COUNSELING: We reviewed the family history information in detail. Cases of cancer follow three general patterns: sporadic, familial, and hereditary.  While most cancer is sporadic, some cases appear to occur in family clusters.  These cases are said to be familial and account for 10-20% of colon cancer cases.  Familial cases may be due to a combination of shared genes and environmental factors among family members.  In even fewer families, the cancer is hereditary, and the genes responsible for the increased risk for cancer are known.      Family histories typical of hereditary cancer syndromes usually include multiple first- and second-degree relatives diagnosed with cancer types that define a syndrome.  These cases tend to be diagnosed at younger-than-expected ages and can be bilateral or multifocal.  The cancer in these families follows an autosomal dominant inheritance pattern, which indicates the likely presence of a mutation in a cancer susceptibility gene.  Children and siblings of an individual believed to carry this mutation have a 50% chance of inheriting that mutation, thereby inheriting the increased risk to develop cancer.  These mutations can be passed down from the maternal or the paternal  lineage.    We discussed familial adenomatous polyposis (FAP), which accounts for less than 1% of all colorectal cancers and is inherited in a dominant manner.  Typically, individuals with classic FAP have 100’s to 1000’s of colon polyps.  Attenuated FAP is a form of FAP associated with an average of 30 colon polyps and cancers diagnosed 10 years later than is typical for FAP.  FAP and AFAP are both caused by mutations in the APC gene.  FAP and AFAP are also associated with an increased risk to develop other types of cancer. These include cancer of the duodenum (4-12%), stomach (<1%), pancreas (<1%), thyroid (<2%) and a less than 1% risk for central nervous system cancers.  Additionally, duodenal and gastric polyps are seen in individuals with AFAP.  The cumulative colon cancer risk with AFAP is estimated to be ~70% by age 80 if the adenomatous polyps are not removed.  Given Mr. Sandoval’s personal history of polyps, another hereditary polyposis condition to be considered is MYH-associated polyposis (MAP). Individuals with MAP have characteristics that resemble AFAP. Unlike most hereditary cancer conditions, MAP is inherited in an autosomal recessive manner. This means that in order to have the condition, an individual must inherit two non-working copies of the gene (mutations); one from each parent.     There are several other genes associated with hereditary cancer and polyposis syndromes; therefore, we discussed the option of pursuing a panel to test for these other genes as well. Some of these genes have well defined cancer risks and established management guidelines.  Other genes that can be tested for have been more recently described, and there may be less data regarding the risks and therefore may not have established management guidelines.  We discussed these limitations at length. Based on Mr. Sandoval’s personal history and his desire to learn more about his personal risks, he opted to pursue testing  through a panel evaluating several other genes known to increase the risk for cancer.    GENETIC TESTING:  The risks, benefits and limitations of multigene panel testing and implications for clinical management following testing were reviewed.  DNA test results can influence decisions regarding screening, prevention and surgical management.  Genetic testing can have significant psychological implications for both individuals and families.  Also discussed was the law in place ([place]) to protect from the possibility of employment and insurance discrimination based on genetic test results, as well as limitations of this law.     The implications of a positive or negative test result were discussed.  We also discussed the possibility that, in some cases, genetic test results may be ambiguous due to the identification of a genetic variant of uncertain significance (VUS).  These variants may or may not be associated with an increased cancer risk. The limited value of negative test results was emphasized, particularly given the significant population risk for cancer and the possibility of other cancer susceptibility genes.      TEST RESULTS:  Genetic testing was negative for known pathogenic mutations by sequencing, rearrangement testing, and RNA analysis for the 36 genes on this panel.  A variant of uncertain significance (VUS) was identified in the NF1 gene and in the SMARCA4 gene, however the majority of VUS’s are ultimately reclassified as benign, therefore this result should not be used in making management decisions at this time. If these variants are ever reclassified a new report will be issued by the laboratory and released directly to the ordering physician, and our office. We discussed the possibility that there could be genes that have yet to be described that could contribute to risk of polyposis.  This assessment is based on the information provided at the time of the consultation.     CANCER SCREENING:   Jaime’s management and screening will be determined based on his personal history of polyps. Recommendations for close relatives can also be made based on family history.  Mr. Sandoval’s close relatives should discuss their family history with their physicians to determine appropriate management. Per current NCCN guidelines, individuals who have a first-degree relative with history of an advanced adenoma, colonoscopy screening should begin at age 40 or at age of onset of adenoma in relative, whichever is first, and repeat every 5-10 years or more frequently based on personal clinical findings. These recommendations are based on the information available at this time and could change should new information become available.     PLAN: Genetic counseling remains available to Mr. Sandoval. He is welcome to contact us with any questions or concerns at [phone].      Maria Elena Craven MS, [facility] [place]  Licensed Certified Genetic Counselor      Cc: Grupo Zarate MD

## 2023-05-04 NOTE — TELEPHONE ENCOUNTER
Rx Refill Note  Requested Prescriptions     Pending Prescriptions Disp Refills   • sulfamethoxazole-trimethoprim (Bactrim DS) 800-160 MG per tablet 14 tablet 0     Sig: Take 1 tablet by mouth 2 (Two) Times a Day.      Last office visit with prescribing clinician: 11/14/2022   Last telemedicine visit with prescribing clinician: Visit date not found   Next office visit with prescribing clinician: Visit date not found                         Would you like a call back once the refill request has been completed: [] Yes [] No    If the office needs to give you a call back, can they leave a voicemail: [] Yes [] No    Oriana Ragland MA  05/04/23, 14:16 EDT

## 2023-05-05 RX ORDER — SULFAMETHOXAZOLE AND TRIMETHOPRIM 800; 160 MG/1; MG/1
1 TABLET ORAL 2 TIMES DAILY
Qty: 14 TABLET | Refills: 0 | OUTPATIENT
Start: 2023-05-05

## 2023-05-19 ENCOUNTER — OFFICE VISIT (OUTPATIENT)
Dept: FAMILY MEDICINE CLINIC | Facility: CLINIC | Age: 38
End: 2023-05-19
Payer: MEDICAID

## 2023-05-19 ENCOUNTER — LAB (OUTPATIENT)
Dept: LAB | Facility: HOSPITAL | Age: 38
End: 2023-05-19
Payer: MEDICAID

## 2023-05-19 VITALS
DIASTOLIC BLOOD PRESSURE: 80 MMHG | TEMPERATURE: 97.3 F | SYSTOLIC BLOOD PRESSURE: 122 MMHG | RESPIRATION RATE: 18 BRPM | WEIGHT: 183.22 LBS | OXYGEN SATURATION: 98 % | HEART RATE: 85 BPM | BODY MASS INDEX: 26.23 KG/M2 | HEIGHT: 70 IN

## 2023-05-19 DIAGNOSIS — R30.9 PAINFUL URINATION: ICD-10-CM

## 2023-05-19 DIAGNOSIS — G89.29 CHRONIC LEFT SHOULDER PAIN: ICD-10-CM

## 2023-05-19 DIAGNOSIS — M25.512 LEFT SHOULDER PAIN, UNSPECIFIED CHRONICITY: ICD-10-CM

## 2023-05-19 DIAGNOSIS — M25.512 CHRONIC LEFT SHOULDER PAIN: ICD-10-CM

## 2023-05-19 DIAGNOSIS — M54.12 CERVICAL RADICULOPATHY: ICD-10-CM

## 2023-05-19 DIAGNOSIS — R30.9 PAINFUL URINATION: Primary | ICD-10-CM

## 2023-05-19 LAB
BILIRUB BLD-MCNC: NEGATIVE MG/DL
CLARITY, POC: CLEAR
COLOR UR: YELLOW
EXPIRATION DATE: NORMAL
GLUCOSE UR STRIP-MCNC: NEGATIVE MG/DL
KETONES UR QL: NEGATIVE
LEUKOCYTE EST, POC: NEGATIVE
Lab: NORMAL
NITRITE UR-MCNC: NEGATIVE MG/ML
PH UR: 6 [PH] (ref 5–8)
PROT UR STRIP-MCNC: NEGATIVE MG/DL
RBC # UR STRIP: NEGATIVE /UL
SP GR UR: 1.02 (ref 1–1.03)
UROBILINOGEN UR QL: NORMAL

## 2023-05-19 PROCEDURE — 87086 URINE CULTURE/COLONY COUNT: CPT

## 2023-05-19 NOTE — PROGRESS NOTES
"Chief Complaint  Shoulder Pain (Shoulder pain on left side , has been going on for months ) and Difficulty Urinating (painful)    History of Present Illness     Difficulty urinating   He has burning when urinating  No rash or discharge from penis  He is not sexually active  This has been going on for 3 weeks  No fevers  No new back pain.   He is agreeable to sti screening.       Shoulder pain left  The pain is on the top of his shoulder and also in the back  This started 2 months ago  He has tingling all the way down to his fingers  He does have some neck pain on occasion. His trap feels heavy.  No injury  He hasn't tried otc pain meds.          The following portions of the patient's history were reviewed and updated as appropriate: allergies, current medications, past family history, past medical history, past social history, past surgical history, and problem list.    OBJECTIVE:  /80   Pulse 85   Temp 97.3 °F (36.3 °C)   Resp 18   Ht 177.8 cm (70\")   Wt 83.1 kg (183 lb 3.5 oz)   SpO2 98%   BMI 26.29 kg/m²       Physical Exam  Constitutional:       General: He is not in acute distress.     Appearance: Normal appearance.   HENT:      Head: Normocephalic and atraumatic.   Eyes:      Extraocular Movements: Extraocular movements intact.   Cardiovascular:      Rate and Rhythm: Normal rate and regular rhythm.      Heart sounds: No murmur heard.  Pulmonary:      Effort: Pulmonary effort is normal. No respiratory distress.      Breath sounds: Normal breath sounds. No stridor. No wheezing, rhonchi or rales.   Abdominal:      General: Bowel sounds are normal.      Palpations: Abdomen is soft.      Tenderness: There is no abdominal tenderness. There is no right CVA tenderness, left CVA tenderness, guarding or rebound.   Musculoskeletal:      Comments: Normal gait, normal rom of his neck.  No pain on palpation of left shoulder or cervical spine.  Jobs test on left is positive. Lift off testing is positive for " pain and severely decreased rom when moving the arm to the back and up in the air.    Skin:     Findings: No rash.   Neurological:      General: No focal deficit present.      Mental Status: He is alert.   Psychiatric:         Mood and Affect: Mood normal.                    Assessment and Plan   Diagnoses and all orders for this visit:    1. Painful urination (Primary)  -     POC Urinalysis Dipstick, Automated  -     Urine Culture - Urine, Urine, Clean Catch; Future  -     OneSwab - Kit, Urine, Catheter; Future    2. Cervical radiculopathy  -     XR Spine Cervical Complete 4 or 5 View; Future  -     XR Shoulder 2+ View Left; Future  -     Ambulatory Referral to Physical Therapy Evaluate and treat    3. Left shoulder pain, unspecified chronicity  -     Ambulatory Referral to Physical Therapy Evaluate and treat    4. Chronic left shoulder pain  -     MRI Shoulder Left Without Contrast; Future      Given severely decreased rom with positive jobs and lift off I am concerned for rotator cuff injury in addition to cervical radiculopathy. Will image and send to physical therapy.     Will send for urine culture. Urinalysis negative.   Check uroswab for sti.  He declines blood sti screening.        Return in about 1 month (around 6/19/2023).       Becky Combs D.O.  [facility] [place]

## 2023-05-21 LAB — BACTERIA SPEC AEROBE CULT: NO GROWTH

## 2023-05-24 ENCOUNTER — TELEPHONE (OUTPATIENT)
Dept: FAMILY MEDICINE CLINIC | Facility: CLINIC | Age: 38
End: 2023-05-24
Payer: MEDICAID

## 2023-05-24 NOTE — TELEPHONE ENCOUNTER
"Hub please read: \"Swab is negative. For any continuing of burning upon urination please schedule an apt. Increase hydration.  \"  "

## 2023-07-11 PROBLEM — M75.52 BURSITIS OF LEFT SHOULDER: Status: ACTIVE | Noted: 2023-07-11

## 2023-07-11 PROBLEM — M75.122 NONTRAUMATIC COMPLETE TEAR OF LEFT ROTATOR CUFF: Status: ACTIVE | Noted: 2023-07-11

## 2023-07-11 PROBLEM — S43.432A SUPERIOR GLENOID LABRUM LESION OF LEFT SHOULDER: Status: ACTIVE | Noted: 2023-07-11

## 2023-07-11 PROBLEM — M75.22 BICEPS TENDINITIS OF LEFT UPPER EXTREMITY: Status: ACTIVE | Noted: 2023-07-11

## 2023-07-11 PROBLEM — M75.42 IMPINGEMENT SYNDROME OF LEFT SHOULDER: Status: ACTIVE | Noted: 2023-07-11

## 2023-08-21 ENCOUNTER — DOCUMENTATION (OUTPATIENT)
Dept: ORTHOPEDIC SURGERY | Facility: CLINIC | Age: 38
End: 2023-08-21

## 2023-08-21 ENCOUNTER — OUTSIDE FACILITY SERVICE (OUTPATIENT)
Dept: ORTHOPEDIC SURGERY | Facility: CLINIC | Age: 38
End: 2023-08-21
Payer: MEDICAID

## 2023-08-21 DIAGNOSIS — Z98.890 STATUS POST LEFT ROTATOR CUFF REPAIR: Primary | ICD-10-CM

## 2023-08-21 DIAGNOSIS — M75.52 BURSITIS OF LEFT SHOULDER: ICD-10-CM

## 2023-08-21 DIAGNOSIS — S43.432A SUPERIOR GLENOID LABRUM LESION OF LEFT SHOULDER, INITIAL ENCOUNTER: ICD-10-CM

## 2023-08-21 DIAGNOSIS — M75.22 BICEPS TENDINITIS OF LEFT UPPER EXTREMITY: ICD-10-CM

## 2023-08-21 DIAGNOSIS — M75.42 IMPINGEMENT SYNDROME OF LEFT SHOULDER: ICD-10-CM

## 2023-08-21 DIAGNOSIS — M75.122 NONTRAUMATIC COMPLETE TEAR OF LEFT ROTATOR CUFF: ICD-10-CM

## 2023-08-21 RX ORDER — ONDANSETRON 4 MG/1
4 TABLET, FILM COATED ORAL EVERY 6 HOURS PRN
Qty: 32 TABLET | Refills: 0 | Status: SHIPPED | OUTPATIENT
Start: 2023-08-21 | End: 2023-08-29

## 2023-08-21 RX ORDER — HYDROCODONE BITARTRATE AND ACETAMINOPHEN 10; 325 MG/1; MG/1
1 TABLET ORAL EVERY 4 HOURS PRN
Qty: 42 TABLET | Refills: 0 | Status: SHIPPED | OUTPATIENT
Start: 2023-08-21 | End: 2023-08-28

## 2023-08-21 RX ORDER — METHOCARBAMOL 500 MG/1
500 TABLET, FILM COATED ORAL 3 TIMES DAILY PRN
Qty: 42 TABLET | Refills: 0 | Status: SHIPPED | OUTPATIENT
Start: 2023-08-21 | End: 2023-09-04

## 2023-08-21 NOTE — PROGRESS NOTES
Operative Report     Side/SHOULDER: Left shoulder    LOCATION:   [facility]  [address] [place] [address]    [facility] OPERATIVE REPORT       Surgeon: Flex Kuo MD     Assistant: MARY LOU Warner     The skilled assistance of the above noted first assistant was necessary during this complex surgical procedure.  The surgical assistant assisted with every aspect of the operation including, but not limited to, proper and safe positioning of the patient, obtaining adequate surgical exposure, manipulation of surgical instruments, suture management, surgical knot tying when necessary, the continual process of hemostasis during the procedure itself in addition to surgical wound closure and removal of the patient from the operating table and returning the patient back to the [facility].  The assistance of the surgical assistant allowed me to perform the most sensitive and technical potions of this operation using 2 hands, thus enhancing efficiency and patient safety.  This would not be possible without the help of a skilled assistant familiar with the procedure and capable of safely performing the aforementioned tasks.     Date of Surgery: 8/21/2023  Shoulder: Left shoulder   Preoperative Diagnosis:  1.     Rotator cuff tear, significant full-thickness tear- treated with arthroscopic rotator cuff repair - CPT 77600  2.     Shoulder impingement syndrome - treated with arthroscopic subacromial decompression with acromioplasty - CPT 95989  3.     The patient had some stiffness noted with examination under anesthesia that did improve prior to arthroscopy but also did perform a opening of the rotator interval given thickened scar tissue within interval.     Postoperative Diagnosis:  1.     SAME as preoperative diagnoses     Procedure:  1.     Arthroscopic rotator cuff repair (1x2) - CPT 32957  2.     Arthroscopic subacromial decompression with acromioplasty - CPT  89689        Admission status: elective outpatient surgery     COVID-19 Statement: The patient understands that the surgery is elective surgery.  Patient is aware of the ongoing COVID-19 pandemic and potential implications.    Complications: None     EBL: 10mL     Specimen: NONE     Anesthesia: general anesthesia     Block: regional interscalene block with single shot per anesthesia     Antibiotics: weight based IV antibiotics infused prior to incision     Time out: Time out was called and the patient, side, site, and intended procedures were confirmed.     Counts: Needle and sponge counts were correct prior to closure.     DVT prophylaxis: The patient will be weight bearing as tolerated on bilateral lower extremities postoperatively with no additional chemical DVT prophylaxis indicated.     Marked: The patient was marked with an indelible marker in the preoperative holding area confirming the correct side and site.     History & Physical:   A history and physical examination was completed and updated in the preoperative holding area.     Consent: The patient signed the consent form for surgery with an understanding of the risks and benefits as outlined in the clinic and in the preoperative holding area.     Risks and Benefits: Specific risks and benefits discussed included - pain, bleeding, infection, injury to nerves or blood vessels, fracture, stiffness, failure to heal, revision surgery, deformity of biceps or asymmetry, complications of the block, anesthetic complications, and medical complications associated with surgery.         Indications for surgery:  The patient has history, physical examination, and radiographic findings confirming the diagnoses.  The patient has persistent pain and functional loss unresponsive to non-operative treatment consisting of selective rest/activity modification, medications, and exercises.  MRI documented the status of the rotator cuff - rotator cuff tearing was noted.      Impingement syndrome - the patient had history and clinical exam findings consistent with shoulder impingement syndrome.  Additionally, the patient had subacromial bursitis which was encountered during the arthroscopic shoulder procedure.  Subacromial decompression with minimal acromioplasty was indicated as part of the treatment of impingement syndrome, and additionally to enhance arthroscopic visualization to enable minimally invasive, arthroscopic rotator cuff repair.    Stiffness-she did have significant pain limited examination in the clinic but with examination under anesthesia this has contributed to some stiffness of the shoulder that did improve with examination under anesthesia.  He did have thickened scar tissue within the rotator interval that was released as well.  He had satisfactory improvements in range of motion.        Operative Findings:  Rotator Cuff Tissue Quality: Poor quality tendon relative to young age.  He had full-thickness tearing a lot of additional bursal sided tearing and intrinsic rotator cuff deficiency     Status of the Rotator Cuff:  Supraspinatus -full-thickness tearing     Size of Rotator Cuff Tear:  Supraspinatus -11 to 12 mm     Rotator Cuff Repair Construct:  1x2 Transosseous Equivalent Double Row Arthroscopic Rotator Cuff Repair      Rotator Cuff Implants:  Medial Row: 1 Medial [place] - [employer] triple loaded 5.5mm Healicoil PEEK   Lateral Row: 2 Lateral Anchors - [employer] 5.5mm Footprint PEEK      Bone Quality: Good bone quality     Labrum: Mild degenerative     Humeral Head: He had some early grade 1 perhaps early grade 2 changes which were unanticipated given young age but early cartilage findings noted  Glenoid: Negative     Contracture: As noted above, patient has developed some stiffness that improved with EUA and with the release of the rotator interval  Pathological laxity: Negative     Procedure in detail:  Regional interscalene block was completed in  the preoperative area by the anesthesia team.  The patient was supine on the operative table and the anesthesia team initiated general anesthesia.  The patient was placed in a modified beach chair position with the neck secured in neutral alignment and all bony prominences were well padded.     The shoulder was assessed with an examination under anesthesia.  Stiffness as noted above.     The operative extremity was cleaned with alcohol and then the extremity was prepped and draped in the standard fashion.  The surgical arm was placed into a well-padded arm eid. A standard posterior portal was created with an incision made 1 cm inferior 1 cm medial to the posterolateral acromial corner.  A blunt metal trocar and cannula were inserted into the glenohumeral joint.  The arthroscopic pump was connected and the above findings and diagnoses were noted as part of the diagnostic shoulder arthroscopy.  Thickened scar tissue within the rotator interval was noted and was released using the shaver and cautery device.     A spinal needle was used to localize the anterior portal position in the rotator interval. A 5.5mm plastic cannula and trocar were inserted followed by a 4.5mm shaver/cautery device.  The shaver was used for debridement in the glenohumeral joint.  The biceps tendon was closely inspected and biceps tenodesis was not warranted.  No biceps tearing, minimal synovitis.     The arthroscope and metal cannula were then removed in order to transition to the subacromial space.  The blunt metal trocar and cannula were inserted into the subacromial space and the lateral portal site identified with a spinal needle.  An 8 mm plastic cannula and trocar were inserted.  I turned my attention to the arthroscopic subacromial decompression with acromioplasty. Bursitis was noted in the subacromial space and impacted visualization of the rotator cuff.  The 4.5mm shaver/cautery device was used to clear the subacromial space until  the acromion could be identified and bursal resection was completed to allow rotator cuff visualization.  The shaver was used to remove fibrous tissue from the acromial undersurface until the anterolateral and anterior medial corners of the acromion were clearly identified.  The coracoacromial ligament was not released.  CA ligament partial tearing was noted and was debrided as part of the acromioplasty.  The shaver was used to perform a minimal acromioplasty.  The shaver/cautery device was used to perform the subacromial decompression with minimal acromioplasty.      I turned my attention to the rotator cuff tear and the arthroscopic rotator cuff repair.  The torn rotator cuff tendon was grasped with a handheld grasper and assessed for mobility and integrity.  The soft tissue at the greater tuberosity insertion site was removed and a power shaver was used to create a healthy bleeding bed to enhance rotator cuff tendon healing.     Arthroscopic rotator cuff suture anchors were then inserted for the rotator cuff repair.  The single medial row arthroscopic rotator cuff repair anchor was inserted and the sutures from the anchors were withdrawn through the anterior cannula. An arthroscopic suture passer was used to place the high strength sutures through the rotator cuff tendon in an inverted mattress fashion. The sutures were then inserted laterally into 2 lateral knotless anchors in a cruciform pattern with appropriate tensioning.  The completed arthroscopic rotator cuff repair was inspected dynamically and the arthroscopic instruments removed along with the arthroscopic fluid. The incisions were closed with nylon suture and steri-strips were placed over the incisions.  A sterile dressing was applied followed by a neutral rotation sling.  The patient tolerated the procedure well and was transported to the recovery room in satisfactory condition.        Postoperative Plan:  Neutral rotation sling  Non-weight  bearing  No biceps loading  Pendulums, elbow, wrist, and hand exercises encouraged  Clinic follow-up appointment scheduled for 2 weeks after surgery        Rotator Cuff Repair - POSTOPERATIVE PLAN:  Follow-up in the office in 2 weeks with 1 view of the operative shoulder at that time  Sutures: Nylon sutures to come out in 2 weeks  DVT prophylaxis: Additional chemical DVT prophylaxis indicated  Weightbearing: Nonweightbearing on operative extremity  Sling for 3 to 4 weeks following the surgery  Physical therapy: Formal outpatient physical therapy will be provided at the 2-week appointment in the office.  Rotator cuff repair protocol  We will have to be mindful of his preoperative pain limited motion in the clinic and his subsequent stiffness noted with examination under anesthesia.    Electronically signed by Flex Kuo MD, 08/21/23, 11:00 AM EDT.

## 2023-09-05 ENCOUNTER — OFFICE VISIT (OUTPATIENT)
Dept: ORTHOPEDIC SURGERY | Facility: CLINIC | Age: 38
End: 2023-09-05
Payer: MEDICAID

## 2023-09-05 VITALS — TEMPERATURE: 97.3 F

## 2023-09-05 DIAGNOSIS — Z98.890 STATUS POST ROTATOR CUFF REPAIR: Primary | ICD-10-CM

## 2023-09-05 NOTE — PROGRESS NOTES
[facility] Office Follow Up       Office Follow Up Visit       Patient Name: Grupo Sandoval    Chief Complaint:   Chief Complaint   Patient presents with   • Post-op     2 week S/P - Left arthroscopic rotator cuff repair with subacromial decompression with acromioplasty (DOS 08/21/2023)       Referring Physician: No ref. provider found    History of Present Illness:   Grupo Sandoval returns to clinic today for 2 week post-op visit s/p left shoulder arthroscopy with RCR and SAD 8/21/23 with Dr. Kuo. He reports to be doing well.  He has been compliant with sling use. Pain has been relatively well controlled. Pain most significantly distal to shoulder in elbow and forearm. No longer taking pain medication.    Procedure:  1.     Arthroscopic rotator cuff repair (1x2) - CPT 78268  2.     Arthroscopic subacromial decompression with acromioplasty - CPT 02471      Subjective     Review of Systems   Constitutional: Negative.  Negative for chills, fatigue and fever.   HENT: Negative.  Negative for congestion and dental problem.    Eyes: Negative.  Negative for blurred vision.   Respiratory: Negative.  Negative for shortness of breath.    Cardiovascular: Negative.  Negative for leg swelling.   Gastrointestinal: Negative.  Negative for abdominal pain.   Endocrine: Negative.  Negative for polyuria.   Genitourinary: Negative.  Negative for difficulty urinating.   Musculoskeletal:  Positive for arthralgias.   Skin: Negative.    Allergic/Immunologic: Negative.    Neurological: Negative.    Hematological: Negative.  Negative for adenopathy.   Psychiatric/Behavioral: Negative.  Negative for behavioral problems.       I have reviewed and updated the following portions of the patient's history and review of systems: allergies, current medications, past family history, past medical history, past social history, past surgical history and problem  list.    Medications: No current outpatient medications on file.    Allergies: No Known Allergies      Objective      Vital Signs:   Vitals:    09/05/23 1309   Temp: 97.3 °F (36.3 °C)       Ortho Exam:  General: comfortable  Vascular: 2+ radial pulse  Neurologic: sensation to light touch is intact distally, elbow flexion/elbow extension/wrist flexion/wrist extension/hand intrinsics intact, able to fire deltoid; no residual defects noted from the block  Dermatologic: surgical incisions are well appearing, with no drainage or surrounding erythema; no signs or symptoms of infection or DVT      Results Review:  XR Shoulder 1 View Left  Imaging: shoulder x-rays 1 view - AP x-ray views    Side: LEFT SHOULDER    Indication for shoulder x-ray 1 view: shoulder pain, postop evaluation   following surgery    Comparison: the current xray was compared to preoperative imaging and   indicates expected postoperative changes.    Findings: No acute bony pathology. Well appearing and well positioned   compared to preoperative imaging.  Located and no fracture noted.    I personally reviewed the above x-rays.       Assessment / Plan      Assessment:   Diagnoses and all orders for this visit:    1. Status post rotator cuff repair (Primary)  -     XR Shoulder 1 View Left  -     Ambulatory Referral to Physical Therapy Ortho, POST OP        Plan:  Recommend over the counter anti-inflammatories for pain and/or swelling.  Patient will continue with the sling for another week or 2.  May come out of sling now for breaks as needed. Emphasized elbow/hand/wrist motion. He was contracted and stiff preoperatively so it is important to avoid increased stiffness with sling use. Remain nonweightbearing on operative side.  To start with physical therapy.  I have given a PT prescription as well as 2 copies of the protocol. Plan to return in 2 months.  We reviewed the intraoperative photographs together.  Sutures out today.       History, diagnosis and  treatment plan discussed with Dr. Kuo.      Follow Up:   2 months      Marija Keenan PA-C  [facility]    Dictated using Dragon Speech Recognition.